# Patient Record
Sex: FEMALE | Race: BLACK OR AFRICAN AMERICAN | NOT HISPANIC OR LATINO | Employment: FULL TIME | URBAN - METROPOLITAN AREA
[De-identification: names, ages, dates, MRNs, and addresses within clinical notes are randomized per-mention and may not be internally consistent; named-entity substitution may affect disease eponyms.]

---

## 2020-07-09 ENCOUNTER — OFFICE VISIT (OUTPATIENT)
Dept: ORTHOPEDICS | Facility: CLINIC | Age: 25
End: 2020-07-09
Payer: MEDICAID

## 2020-07-09 ENCOUNTER — HOSPITAL ENCOUNTER (OUTPATIENT)
Dept: RADIOLOGY | Facility: HOSPITAL | Age: 25
Discharge: HOME | End: 2020-07-09
Attending: ORTHOPAEDIC SURGERY
Payer: MEDICAID

## 2020-07-09 VITALS
RESPIRATION RATE: 18 BRPM | BODY MASS INDEX: 36.1 KG/M2 | OXYGEN SATURATION: 98 % | HEART RATE: 72 BPM | TEMPERATURE: 99 F | WEIGHT: 230 LBS | HEIGHT: 67 IN

## 2020-07-09 DIAGNOSIS — S82.891A CLOSED FRACTURE OF RIGHT ANKLE, INITIAL ENCOUNTER: ICD-10-CM
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
## 2020-07-09 DIAGNOSIS — S82.61XA DISPLACED FRACTURE OF LATERAL MALLEOLUS OF RIGHT FIBULA, INITIAL ENCOUNTER FOR CLOSED FRACTURE: ICD-10-CM

## 2020-07-09 DIAGNOSIS — M25.571 ACUTE RIGHT ANKLE PAIN: ICD-10-CM

## 2020-07-09 DIAGNOSIS — R26.81 GAIT INSTABILITY: ICD-10-CM

## 2020-07-09 DIAGNOSIS — S82.61XA TRAUMATIC CLOSED DISPLACED FRACTURE OF LATERAL MALLEOLUS, RIGHT, INITIAL ENCOUNTER: ICD-10-CM

## 2020-07-09 DIAGNOSIS — S82.391A CLOSED INTRA-ARTICULAR FRACTURE OF DISTAL TIBIA, RIGHT, INITIAL ENCOUNTER: ICD-10-CM

## 2020-07-09 DIAGNOSIS — Z01.818 PRE-OP TESTING: Primary | ICD-10-CM

## 2020-07-09 DIAGNOSIS — M25.571 ACUTE RIGHT ANKLE PAIN: Primary | ICD-10-CM

## 2020-07-09 PROCEDURE — 99203 OFFICE O/P NEW LOW 30 MIN: CPT | Mod: PBBFAC,25,PN | Performed by: ORTHOPAEDIC SURGERY

## 2020-07-09 PROCEDURE — 99204 OFFICE O/P NEW MOD 45 MIN: CPT | Mod: 57,S$PBB,, | Performed by: ORTHOPAEDIC SURGERY

## 2020-07-09 PROCEDURE — 73610 X-RAY EXAM OF ANKLE: CPT | Mod: TC,PN,RT

## 2020-07-09 PROCEDURE — 73610 X-RAY EXAM OF ANKLE: CPT | Mod: 26,RT,, | Performed by: RADIOLOGY

## 2020-07-09 PROCEDURE — 99204 PR OFFICE/OUTPT VISIT, NEW, LEVL IV, 45-59 MIN: ICD-10-PCS | Mod: 57,S$PBB,, | Performed by: ORTHOPAEDIC SURGERY

## 2020-07-09 PROCEDURE — 99999 PR PBB SHADOW E&M-NEW PATIENT-LVL III: ICD-10-PCS | Mod: PBBFAC,,, | Performed by: ORTHOPAEDIC SURGERY

## 2020-07-09 PROCEDURE — 99999 PR PBB SHADOW E&M-NEW PATIENT-LVL III: CPT | Mod: PBBFAC,,, | Performed by: ORTHOPAEDIC SURGERY

## 2020-07-09 PROCEDURE — 73610 XR ANKLE COMPLETE 3 VIEW RIGHT: ICD-10-PCS | Mod: 26,RT,, | Performed by: RADIOLOGY

## 2020-07-09 RX ORDER — SODIUM CHLORIDE 9 MG/ML
INJECTION, SOLUTION INTRAVENOUS CONTINUOUS
Status: CANCELLED | OUTPATIENT
Start: 2020-07-09

## 2020-07-09 RX ORDER — HYDROCODONE BITARTRATE AND ACETAMINOPHEN 10; 325 MG/1; MG/1
TABLET ORAL
COMMUNITY
Start: 2020-07-05 | End: 2020-07-17

## 2020-07-09 RX ORDER — HYDROCODONE BITARTRATE AND ACETAMINOPHEN 7.5; 325 MG/1; MG/1
1 TABLET ORAL EVERY 6 HOURS PRN
COMMUNITY
End: 2021-09-07

## 2020-07-09 RX ORDER — MUPIROCIN 20 MG/G
OINTMENT TOPICAL
Status: CANCELLED | OUTPATIENT
Start: 2020-07-09

## 2020-07-09 NOTE — H&P
Subjective:     Chief Complaint: Injury of the Right Ankle    HPI:  Ms. Buck is a 25-year-old lady who presented today for evaluation of 4 days of right ankle pain which began on 2020 after she was traversing a step going into a recessed area of her den and stepped funny missing a step causing her to twist her ankle.  She had sudden pain in her ankle and was seen at [facility]'s emergency room in [facility], and after x-rays showed a distal tibia/lateral malleolus fracture she was placed in a splint.  Moving her ankle increases her symptoms while nothing seems to improve her pain.  She states she has some mild numbness and tingling in her foot.    Past medical history:  Headaches  Anemia    Past surgical history:  Denied appendectomy/tonsillectomy/tympanostomy tubes/LASIK eye surgery/arthroscopy of the knee//tubal ligation/colonoscopy.    Review of patient's allergies indicates:  No Known Allergies    Social History     Occupational History    Traveling CNA.   Tobacco Use    Smoking status: Three cigarettes per day for the last 3 years   Substance and Sexual Activity    Alcohol use: Denied ethanol use last drink 6 months ago    Drug use: Smokes marijuana    Sexual activity: Heterosexual      Family history:  Father:  Unsure.  Mother:  Alive, unsure.  Brother:  1, alive, unsure.  Sister:  2, alive, denied medical problems.    Previous Hospitalizations:  Denied previous hospitalizations.    ROS:   Review of Systems   Constitution: Negative for chills and fever.   HENT: Negative for congestion.    Eyes: Negative for blurred vision.   Cardiovascular: Negative for chest pain and dyspnea on exertion.   Respiratory: Negative for cough and shortness of breath.    Endocrine: Negative for polydipsia.   Hematologic/Lymphatic: Negative for adenopathy.   Skin: Negative for flushing, itching, rash and skin cancer.   Musculoskeletal: Positive for joint pain and joint swelling. Negative for  back pain and gout.   Gastrointestinal: Negative for anorexia, constipation, diarrhea and heartburn.   Genitourinary: Negative for bladder incontinence and nocturia.   Neurological: Positive for headaches. Negative for seizures.   Psychiatric/Behavioral: Positive for substance abuse. Negative for depression. The patient is not nervous/anxious.    Allergic/Immunologic: Negative for environmental allergies.           Objective:      Physical Exam:   General: AAOx3.  No acute distress  HEENT: Normocephalic, PEARLA EOMI, Good Dentition  Neck: Supple, No JVD  Chest: Symetric, equal excursion on inspiration  Abdomen: Soft NTND  Vascular:  Pulses intact and equal bilaterally.  Capillary refill less than 3 seconds and equal bilaterally  Neurologic:  Pinprick and soft touch intact and equal bilaterally  Integment:  Mild Susy-right ankle ecchymosis  Extremity:  Ankle:  Dorsiflexion/plantar flexion right ankle 5 degree/5°, left ankle 20°/25°.  Inversion/eversion decreased right ankle compared to the left.  Tender with motion right ankle.  Positive swelling right ankle.  Tender with palpation right ankle.  Nontender at the Achilles insertion on the calcaneus bilaterally.  Achilles palpable throughout full distribution bilaterally.  Nontender at the plantar fascia insertion on the calcaneus.  Nontender at the Lisfranc interval bilaterally.  Squeeze test negative bilaterally.  Tender with palpation lateral malleolus right ankle.  Tender with palpation medial distal tibia right ankle.  Radiography:  Personally reviewed x-rays of the right ankle completed on 07/09/2020 showed a minimally displaced lateral malleolus fracture along with a minimally displaced distal tibia fracture with questionable intra-articular extension.  Mild medial widening appears.      Assessment:       Impression:      1. Displaced intra-articular medial tibia fracture, right ankle.   2. Displaced lateral malleolus fracture, right ankle.   3. Right ankle  pain.                     Plan:       1.  Discussed physical examination and radiographic findings with the patient. Mayda understands that she has a displaced lateral malleolus and medial tibia fracture.  Treatment alternatives and outcomes were discussed with the patient.  She understands she could be treated conservatively with cast immobilization, elevation, pain management, physical therapy, or she could consider surgical intervention to align and stabilize the ankle bones.  She understands that since she possibly has an intra-articular fracture and she does have some displacement of her fractures that surgical intervention the form of open reduction internal fixation is 1 of the best options.  She understands before proceeding with surgery a CT scan to evaluate the architecture of her fracture as needed.  She stated she would like to proceed with surgery.  2.  Possible complications of surgery to include bleeding, infection, scarring, nerve/blood vessel/tendon damage, need for further surgery, failed surgery, failure to improve, possible persistent pain, possible fracture, possible hardware failure, possible hardware breakage, possible nonunion, possible malunion, possible arthrofibrosis, possible arthritis, possible DVT, possible pulmonary embolus, possible amputation, and possible demise were discussed with the patient.  The patient was permitted to ask questions and all concerns were addressed to her satisfaction.  3.  Consent for open reduction internal fixation lateral malleolus right ankle and distal tibia right ankle.  4.  Tentatively schedule surgery for 07/20/2020.  5.  Refer for a CT scan of the right ankle.  6.  A prescription for a walker was given to the patient as she is having issues or ambulating with crutchs.  7.  A prescription for the patient to obtain a bedside commode was given to her.  8.  A prescription for the patient to get a knee scooter was given to her.  9.  Norco 7.5/325, 1 p.o.  q.4-6 hours p.r.n. pain, dispense 30, refill 0 the patient understands she needs to use these sparingly as there is a limit to the amount of pain medications it will be given.  10.  All postoperative meds will be prescribed on the date of surgery.  11.  Elevate right ankle.  It was explained to the patient what elevation means.  12.  CAM walker right lower extremity the patient understands she must treat it like a cast and wear it at all times even to sleep in.  13.  [facility] portal was discussed with the patient and information was given.  The patient was encouraged to use the portal for future encounters.  14.  Follow up in 1 week, after CT scan is completed, to evaluate swelling in the ankle she understands if her swelling is still elevated her surgery may be postponed.

## 2020-07-09 NOTE — LETTER
July 9, 2020      [facility]  [place] [facility]  [address], [place]  [place] [address]  Phone: [phone]  Fax: [phone]       Patient: Mayda Buck   YOB: 1995  Date of Visit: 07/09/2020    To Whom It May Concern:    Mayda Buck was at [facility] being treated by Dr. Nico Rasmussen on 07/09/2020. She may return to work/school on 08/01/2020 with restrictions of seated work only. No lifting/pushing/pulling/climbing with the use of the right leg. No work on ladders or scaffolding. Nonweightbearing to the right lower extremity. Must be allowed to ambulate with assistive device. Please excuse her from work between the dates of 07/09/2020-07/31/2020. If you have any questions or concerns, or if I can be of further assistance, please do not hesitate to contact me.    Sincerely,          Skye Roy LPN

## 2020-07-09 NOTE — PROGRESS NOTES
Subjective:      Patient ID: Mayda Buck is a 25 y.o. female.    Chief Complaint: Injury of the Right Ankle    Referring Provider: Tonja Self  No address on file    HPI:  Ms. Buck is a 25-year-old lady who presented today for evaluation of 4 days of right ankle pain which began on 2020 after she was traversing a step going into a recessed area of her den and stepped funny missing a step causing her to twist her ankle.  She had sudden pain in her ankle and was seen at [facility]'s emergency room in [facility], and after x-rays showed a distal tibia/lateral malleolus fracture she was placed in a splint.  Moving her ankle increases her symptoms while nothing seems to improve her pain.  She states she has some mild numbness and tingling in her foot.    Past medical history:  Headaches  Anemia    Past surgical history:  Denied appendectomy/tonsillectomy/tympanostomy tubes/LASIK eye surgery/arthroscopy of the knee//tubal ligation/colonoscopy.    Review of patient's allergies indicates:  No Known Allergies    Social History     Occupational History    Traveling CNA.   Tobacco Use    Smoking status: Three cigarettes per day for the last 3 years   Substance and Sexual Activity    Alcohol use: Denied ethanol use last drink 6 months ago    Drug use: Smokes marijuana    Sexual activity: Heterosexual      Family history:  Father:  Unsure.  Mother:  Alive, unsure.  Brother:  1, alive, unsure.  Sister:  2, alive, denied medical problems.    Previous Hospitalizations:  Denied previous hospitalizations.    ROS:   Review of Systems   Constitution: Negative for chills and fever.   HENT: Negative for congestion.    Eyes: Negative for blurred vision.   Cardiovascular: Negative for chest pain and dyspnea on exertion.   Respiratory: Negative for cough and shortness of breath.    Endocrine: Negative for polydipsia.   Hematologic/Lymphatic: Negative for adenopathy.   Skin: Negative for  flushing, itching, rash and skin cancer.   Musculoskeletal: Positive for joint pain and joint swelling. Negative for back pain and gout.   Gastrointestinal: Negative for anorexia, constipation, diarrhea and heartburn.   Genitourinary: Negative for bladder incontinence and nocturia.   Neurological: Positive for headaches. Negative for seizures.   Psychiatric/Behavioral: Positive for substance abuse. Negative for depression. The patient is not nervous/anxious.    Allergic/Immunologic: Negative for environmental allergies.           Objective:      Physical Exam:   General: AAOx3.  No acute distress  HEENT: Normocephalic, PEARLA EOMI, Good Dentition  Neck: Supple, No JVD  Chest: Symetric, equal excursion on inspiration  Abdomen: Soft NTND  Vascular:  Pulses intact and equal bilaterally.  Capillary refill less than 3 seconds and equal bilaterally  Neurologic:  Pinprick and soft touch intact and equal bilaterally  Integment:  Mild Susy-right ankle ecchymosis  Extremity:  Ankle:  Dorsiflexion/plantar flexion right ankle 5 degree/5°, left ankle 20°/25°.  Inversion/eversion decreased right ankle compared to the left.  Tender with motion right ankle.  Positive swelling right ankle.  Tender with palpation right ankle.  Nontender at the Achilles insertion on the calcaneus bilaterally.  Achilles palpable throughout full distribution bilaterally.  Nontender at the plantar fascia insertion on the calcaneus.  Nontender at the Lisfranc interval bilaterally.  Squeeze test negative bilaterally.  Tender with palpation lateral malleolus right ankle.  Tender with palpation medial distal tibia right ankle.  Radiography:  Personally reviewed x-rays of the right ankle completed on 07/09/2020 showed a minimally displaced lateral malleolus fracture along with a minimally displaced distal tibia fracture with questionable intra-articular extension.  Mild medial widening appears.      Assessment:       Impression:      1. Displaced  intra-articular medial tibia fracture, right ankle.   2. Displaced lateral malleolus fracture, right ankle.   3. Right ankle pain.                     Plan:       1.  Discussed physical examination and radiographic findings with the patient. Mayda understands that she has a displaced lateral malleolus and medial tibia fracture.  Treatment alternatives and outcomes were discussed with the patient.  She understands she could be treated conservatively with cast immobilization, elevation, pain management, physical therapy, or she could consider surgical intervention to align and stabilize the ankle bones.  She understands that since she possibly has an intra-articular fracture and she does have some displacement of her fractures that surgical intervention the form of open reduction internal fixation is 1 of the best options.  She understands before proceeding with surgery a CT scan to evaluate the architecture of her fracture as needed.  She stated she would like to proceed with surgery.  2.  Possible complications of surgery to include bleeding, infection, scarring, nerve/blood vessel/tendon damage, need for further surgery, failed surgery, failure to improve, possible persistent pain, possible fracture, possible hardware failure, possible hardware breakage, possible nonunion, possible malunion, possible arthrofibrosis, possible arthritis, possible DVT, possible pulmonary embolus, possible amputation, and possible demise were discussed with the patient.  The patient was permitted to ask questions and all concerns were addressed to her satisfaction.  3.  Consent for open reduction internal fixation lateral malleolus right ankle and distal tibia right ankle.  4.  Tentatively schedule surgery for 07/20/2020.  5.  Refer for a CT scan of the right ankle.  6.  A prescription for a walker was given to the patient as she is having issues or ambulating with crutchs.  7.  A prescription for the patient to obtain a bedside  commode was given to her.  8.  A prescription for the patient to get a knee scooter was given to her.  9.  Norco 7.5/325, 1 p.o. q.4-6 hours p.r.n. pain, dispense 30, refill 0 the patient understands she needs to use these sparingly as there is a limit to the amount of pain medications it will be given.  10.  All postoperative meds will be prescribed on the date of surgery.  11.  Elevate right ankle.  It was explained to the patient what elevation means.  12.  CAM walker right lower extremity the patient understands she must treat it like a cast and wear it at all times even to sleep in.  13.  [facility] portal was discussed with the patient and information was given.  The patient was encouraged to use the portal for future encounters.  14.  Follow up in 1 week, after CT scan is completed, to evaluate swelling in the ankle she understands if her swelling is still elevated her surgery may be postponed.

## 2020-07-09 NOTE — H&P (VIEW-ONLY)
Subjective:     Chief Complaint: Injury of the Right Ankle    HPI:  Ms. Buck is a 25-year-old lady who presented today for evaluation of 4 days of right ankle pain which began on 2020 after she was traversing a step going into a recessed area of her den and stepped funny missing a step causing her to twist her ankle.  She had sudden pain in her ankle and was seen at [facility]'s emergency room in [facility], and after x-rays showed a distal tibia/lateral malleolus fracture she was placed in a splint.  Moving her ankle increases her symptoms while nothing seems to improve her pain.  She states she has some mild numbness and tingling in her foot.    Past medical history:  Headaches  Anemia    Past surgical history:  Denied appendectomy/tonsillectomy/tympanostomy tubes/LASIK eye surgery/arthroscopy of the knee//tubal ligation/colonoscopy.    Review of patient's allergies indicates:  No Known Allergies    Social History     Occupational History    Traveling CNA.   Tobacco Use    Smoking status: Three cigarettes per day for the last 3 years   Substance and Sexual Activity    Alcohol use: Denied ethanol use last drink 6 months ago    Drug use: Smokes marijuana    Sexual activity: Heterosexual      Family history:  Father:  Unsure.  Mother:  Alive, unsure.  Brother:  1, alive, unsure.  Sister:  2, alive, denied medical problems.    Previous Hospitalizations:  Denied previous hospitalizations.    ROS:   Review of Systems   Constitution: Negative for chills and fever.   HENT: Negative for congestion.    Eyes: Negative for blurred vision.   Cardiovascular: Negative for chest pain and dyspnea on exertion.   Respiratory: Negative for cough and shortness of breath.    Endocrine: Negative for polydipsia.   Hematologic/Lymphatic: Negative for adenopathy.   Skin: Negative for flushing, itching, rash and skin cancer.   Musculoskeletal: Positive for joint pain and joint swelling. Negative for  back pain and gout.   Gastrointestinal: Negative for anorexia, constipation, diarrhea and heartburn.   Genitourinary: Negative for bladder incontinence and nocturia.   Neurological: Positive for headaches. Negative for seizures.   Psychiatric/Behavioral: Positive for substance abuse. Negative for depression. The patient is not nervous/anxious.    Allergic/Immunologic: Negative for environmental allergies.           Objective:      Physical Exam:   General: AAOx3.  No acute distress  HEENT: Normocephalic, PEARLA EOMI, Good Dentition  Neck: Supple, No JVD  Chest: Symetric, equal excursion on inspiration  Abdomen: Soft NTND  Vascular:  Pulses intact and equal bilaterally.  Capillary refill less than 3 seconds and equal bilaterally  Neurologic:  Pinprick and soft touch intact and equal bilaterally  Integment:  Mild Susy-right ankle ecchymosis  Extremity:  Ankle:  Dorsiflexion/plantar flexion right ankle 5 degree/5°, left ankle 20°/25°.  Inversion/eversion decreased right ankle compared to the left.  Tender with motion right ankle.  Positive swelling right ankle.  Tender with palpation right ankle.  Nontender at the Achilles insertion on the calcaneus bilaterally.  Achilles palpable throughout full distribution bilaterally.  Nontender at the plantar fascia insertion on the calcaneus.  Nontender at the Lisfranc interval bilaterally.  Squeeze test negative bilaterally.  Tender with palpation lateral malleolus right ankle.  Tender with palpation medial distal tibia right ankle.  Radiography:  Personally reviewed x-rays of the right ankle completed on 07/09/2020 showed a minimally displaced lateral malleolus fracture along with a minimally displaced distal tibia fracture with questionable intra-articular extension.  Mild medial widening appears.      Assessment:       Impression:      1. Displaced intra-articular medial tibia fracture, right ankle.   2. Displaced lateral malleolus fracture, right ankle.   3. Right ankle  pain.                     Plan:       1.  Discussed physical examination and radiographic findings with the patient. Mayda understands that she has a displaced lateral malleolus and medial tibia fracture.  Treatment alternatives and outcomes were discussed with the patient.  She understands she could be treated conservatively with cast immobilization, elevation, pain management, physical therapy, or she could consider surgical intervention to align and stabilize the ankle bones.  She understands that since she possibly has an intra-articular fracture and she does have some displacement of her fractures that surgical intervention the form of open reduction internal fixation is 1 of the best options.  She understands before proceeding with surgery a CT scan to evaluate the architecture of her fracture as needed.  She stated she would like to proceed with surgery.  2.  Possible complications of surgery to include bleeding, infection, scarring, nerve/blood vessel/tendon damage, need for further surgery, failed surgery, failure to improve, possible persistent pain, possible fracture, possible hardware failure, possible hardware breakage, possible nonunion, possible malunion, possible arthrofibrosis, possible arthritis, possible DVT, possible pulmonary embolus, possible amputation, and possible demise were discussed with the patient.  The patient was permitted to ask questions and all concerns were addressed to her satisfaction.  3.  Consent for open reduction internal fixation lateral malleolus right ankle and distal tibia right ankle.  4.  Tentatively schedule surgery for 07/20/2020.  5.  Refer for a CT scan of the right ankle.  6.  A prescription for a walker was given to the patient as she is having issues or ambulating with crutchs.  7.  A prescription for the patient to obtain a bedside commode was given to her.  8.  A prescription for the patient to get a knee scooter was given to her.  9.  Norco 7.5/325, 1 p.o.  q.4-6 hours p.r.n. pain, dispense 30, refill 0 the patient understands she needs to use these sparingly as there is a limit to the amount of pain medications it will be given.  10.  All postoperative meds will be prescribed on the date of surgery.  11.  Elevate right ankle.  It was explained to the patient what elevation means.  12.  CAM walker right lower extremity the patient understands she must treat it like a cast and wear it at all times even to sleep in.  13.  [facility] portal was discussed with the patient and information was given.  The patient was encouraged to use the portal for future encounters.  14.  Follow up in 1 week, after CT scan is completed, to evaluate swelling in the ankle she understands if her swelling is still elevated her surgery may be postponed.

## 2020-07-16 ENCOUNTER — HOSPITAL ENCOUNTER (OUTPATIENT)
Dept: RADIOLOGY | Facility: HOSPITAL | Age: 25
Discharge: HOME | End: 2020-07-16
Attending: ORTHOPAEDIC SURGERY
Payer: MEDICAID

## 2020-07-16 DIAGNOSIS — S82.891A CLOSED FRACTURE OF RIGHT ANKLE, INITIAL ENCOUNTER: ICD-10-CM

## 2020-07-16 PROCEDURE — 73700 CT LOWER EXTREMITY W/O DYE: CPT | Mod: TC,RT

## 2020-07-16 PROCEDURE — 73700 CT LOWER EXTREMITY W/O DYE: CPT | Mod: 26,RT,, | Performed by: RADIOLOGY

## 2020-07-16 PROCEDURE — 73700 CT ANKLE (INCLUDING HINDFOOT) WITHOUT CONTRAST RIGHT: ICD-10-PCS | Mod: 26,RT,, | Performed by: RADIOLOGY

## 2020-07-17 ENCOUNTER — OFFICE VISIT (OUTPATIENT)
Dept: ORTHOPEDICS | Facility: CLINIC | Age: 25
End: 2020-07-17
Payer: MEDICAID

## 2020-07-17 ENCOUNTER — LAB VISIT (OUTPATIENT)
Dept: FAMILY MEDICINE | Facility: CLINIC | Age: 25
End: 2020-07-17
Payer: MEDICAID

## 2020-07-17 ENCOUNTER — HOSPITAL ENCOUNTER (OUTPATIENT)
Dept: PREADMISSION TESTING | Facility: HOSPITAL | Age: 25
Discharge: HOME | End: 2020-07-17
Attending: ORTHOPAEDIC SURGERY
Payer: MEDICAID

## 2020-07-17 ENCOUNTER — LAB VISIT (OUTPATIENT)
Dept: LAB | Facility: HOSPITAL | Age: 25
End: 2020-07-17
Attending: ORTHOPAEDIC SURGERY
Payer: MEDICAID

## 2020-07-17 VITALS — HEIGHT: 67 IN | RESPIRATION RATE: 18 BRPM | BODY MASS INDEX: 36.1 KG/M2 | WEIGHT: 230 LBS

## 2020-07-17 DIAGNOSIS — Z01.818 PRE-OP EXAM: Primary | ICD-10-CM

## 2020-07-17 DIAGNOSIS — S82.891A CLOSED FRACTURE OF RIGHT ANKLE, INITIAL ENCOUNTER: ICD-10-CM

## 2020-07-17 DIAGNOSIS — S82.871D CLOSED DISPLACED PILON FRACTURE OF RIGHT TIBIA WITH ROUTINE HEALING, SUBSEQUENT ENCOUNTER: Primary | ICD-10-CM

## 2020-07-17 LAB
ANION GAP SERPL CALC-SCNC: 9 MMOL/L (ref 8–16)
BASOPHILS # BLD AUTO: 0.03 K/UL (ref 0–0.2)
BASOPHILS NFR BLD: 0.5 % (ref 0–1.9)
BUN SERPL-MCNC: 10 MG/DL (ref 6–20)
CALCIUM SERPL-MCNC: 8.9 MG/DL (ref 8.7–10.5)
CHLORIDE SERPL-SCNC: 101 MMOL/L (ref 95–110)
CO2 SERPL-SCNC: 25 MMOL/L (ref 23–29)
CREAT SERPL-MCNC: 0.7 MG/DL (ref 0.5–1.4)
DIFFERENTIAL METHOD: ABNORMAL
EOSINOPHIL # BLD AUTO: 0.1 K/UL (ref 0–0.5)
EOSINOPHIL NFR BLD: 1.1 % (ref 0–8)
ERYTHROCYTE [DISTWIDTH] IN BLOOD BY AUTOMATED COUNT: 18.8 % (ref 11.5–14.5)
EST. GFR  (AFRICAN AMERICAN): >60 ML/MIN/1.73 M^2
EST. GFR  (NON AFRICAN AMERICAN): >60 ML/MIN/1.73 M^2
GLUCOSE SERPL-MCNC: 95 MG/DL (ref 70–110)
HCT VFR BLD AUTO: 34.1 % (ref 37–48.5)
HGB BLD-MCNC: 11 G/DL (ref 12–16)
IMM GRANULOCYTES # BLD AUTO: 0.01 K/UL (ref 0–0.04)
IMM GRANULOCYTES NFR BLD AUTO: 0.2 % (ref 0–0.5)
INR PPP: 1.1 (ref 0.8–1.2)
LYMPHOCYTES # BLD AUTO: 2.6 K/UL (ref 1–4.8)
LYMPHOCYTES NFR BLD: 39.6 % (ref 18–48)
MCH RBC QN AUTO: 25.8 PG (ref 27–31)
MCHC RBC AUTO-ENTMCNC: 32.3 G/DL (ref 32–36)
MCV RBC AUTO: 80 FL (ref 82–98)
MONOCYTES # BLD AUTO: 0.5 K/UL (ref 0.3–1)
MONOCYTES NFR BLD: 7.2 % (ref 4–15)
NEUTROPHILS # BLD AUTO: 3.4 K/UL (ref 1.8–7.7)
NEUTROPHILS NFR BLD: 51.4 % (ref 38–73)
NRBC BLD-RTO: 0 /100 WBC
PLATELET # BLD AUTO: 285 K/UL (ref 150–350)
PMV BLD AUTO: 10.6 FL (ref 9.2–12.9)
POTASSIUM SERPL-SCNC: 3.8 MMOL/L (ref 3.5–5.1)
PROTHROMBIN TIME: 10.8 SEC (ref 9–12.5)
RBC # BLD AUTO: 4.27 M/UL (ref 4–5.4)
SODIUM SERPL-SCNC: 135 MMOL/L (ref 136–145)
WBC # BLD AUTO: 6.52 K/UL (ref 3.9–12.7)

## 2020-07-17 PROCEDURE — 85610 PROTHROMBIN TIME: CPT

## 2020-07-17 PROCEDURE — 99024 PR POST-OP FOLLOW-UP VISIT: ICD-10-PCS | Mod: ,,, | Performed by: ORTHOPAEDIC SURGERY

## 2020-07-17 PROCEDURE — 99999 PR PBB SHADOW E&M-EST. PATIENT-LVL II: ICD-10-PCS | Mod: PBBFAC,,, | Performed by: ORTHOPAEDIC SURGERY

## 2020-07-17 PROCEDURE — 99900103 DSU ONLY-NO CHARGE-INITIAL HR (STAT)

## 2020-07-17 PROCEDURE — 99212 OFFICE O/P EST SF 10 MIN: CPT | Mod: PBBFAC | Performed by: ORTHOPAEDIC SURGERY

## 2020-07-17 PROCEDURE — 85025 COMPLETE CBC W/AUTO DIFF WBC: CPT

## 2020-07-17 PROCEDURE — U0003 INFECTIOUS AGENT DETECTION BY NUCLEIC ACID (DNA OR RNA); SEVERE ACUTE RESPIRATORY SYNDROME CORONAVIRUS 2 (SARS-COV-2) (CORONAVIRUS DISEASE [COVID-19]), AMPLIFIED PROBE TECHNIQUE, MAKING USE OF HIGH THROUGHPUT TECHNOLOGIES AS DESCRIBED BY CMS-2020-01-R: HCPCS

## 2020-07-17 PROCEDURE — 99999 PR PBB SHADOW E&M-EST. PATIENT-LVL II: CPT | Mod: PBBFAC,,, | Performed by: ORTHOPAEDIC SURGERY

## 2020-07-17 PROCEDURE — 36415 COLL VENOUS BLD VENIPUNCTURE: CPT

## 2020-07-17 PROCEDURE — 80048 BASIC METABOLIC PNL TOTAL CA: CPT

## 2020-07-17 PROCEDURE — 99024 POSTOP FOLLOW-UP VISIT: CPT | Mod: ,,, | Performed by: ORTHOPAEDIC SURGERY

## 2020-07-17 RX ORDER — CEPHALEXIN 500 MG/1
500 CAPSULE ORAL EVERY 6 HOURS
COMMUNITY
End: 2021-09-07

## 2020-07-17 NOTE — PROGRESS NOTES
Subjective:      Patient ID: Mayda Buck is a 25 y.o. female.    Chief Complaint: Follow-up of the Right Ankle      HPI:   Ms. Buck returns today for re-evaluation of her right ankle.  She injured her right ankle on 07/05/2020 after she was traversing a step going into a recessed area of her den and stepped funny missing a step causing her to twist her ankle. At her last visit she was forwarded for a CT scan. She has been elevating her ankle.  She has also been wearing her Cam walker.  Her pain has improved.    ROS:   No new diagnosis/ surgery/ prescriptions since last office visit on  07/09/2020.  Constitution: Negative for chills and fever.   HENT: Negative for congestion.    Eyes: Negative for blurred vision.   Cardiovascular: Negative for chest pain and dyspnea on exertion.   Respiratory: Negative for cough and shortness of breath.    Endocrine: Negative for polydipsia.   Hematologic/Lymphatic: Negative for adenopathy.   Skin: Negative for flushing, itching, rash and skin cancer.   Musculoskeletal: Positive for joint pain and joint swelling. Negative for back pain and gout.   Gastrointestinal: Negative for anorexia, constipation, diarrhea and heartburn.   Genitourinary: Negative for bladder incontinence and nocturia.   Neurological: Positive for headaches. Negative for seizures.   Psychiatric/Behavioral: Positive for substance abuse. Negative for depression. The patient is not nervous/anxious.    Allergic/Immunologic: Negative for environmental allergies.       Objective:      Physical Exam:   General: AAOx3.  No acute distress  Vascular:  Pulses intact and equal bilaterally.  Capillary refill less than 3 seconds and equal bilaterally  Neurologic:  Pinprick and soft touch intact and equal bilaterally  Integment:  No ecchymosis, no errythema .  Extremity: Ankle:  Dorsiflexion/plantar flexion right ankle 10 degree/10°, left ankle 20°/25°.  Inversion/eversion decreased right ankle compared to the left.   Tender with motion right ankle.   Swelling decreased right ankle. Wrinkle sign right ankle.  Tender with palpation right ankle.  Nontender at the Achilles insertion on the calcaneus bilaterally.  Achilles palpable throughout full distribution bilaterally.  Nontender at the plantar fascia insertion on the calcaneus.  Nontender at the Lisfranc interval bilaterally.  Squeeze test negative bilaterally.  Tender with palpation lateral malleolus right ankle.  Tender with palpation medial distal tibia right ankle.  Radiography:  Personally reviewed  CT scan of the right ankle which showed a comminuted mildly displaced intra-articular medial tibia a/posterior malleolus fracture with a concomitant displaced lateral malleolus fracture.      Assessment:       Impression:     1.  Comminuted displaced pilon fracture right distal tibia.  2. Displaced lateral malleolus fracture, right ankle.      Plan:       1.  Discussed physical examination and radiographic findings with the patient. Mayda understands that she has   Comminuted displaced peel on fracture of her right ankle and the recommendation is for surgical repair in stabilization. She is currently scheduled for surgery for Monday 07/20/2020.  She wants to proceed with surgery.  2.  Continue with Cam Walker to right ankle.  3. Continue to ambulate nonweightbearing to the right lower extremity.    4. Continue to elevate right ankle this was reinforced with the patient.  5. Norco 7.5/325, 1 p.o. q.4-6 hours p.r.n. pain, dispense 30, refill 0 the patient understands this medication is for postop use.    6. Keflex 500 mg, 1 p.o. q.i.d., dispense 20, refill 0, the patient understands this is for postop use.    7. [facility] portal was discussed with the patient and information was given.  The patient was encouraged to use the portal for future encounters.  8. Follow up 10-12 days postop.

## 2020-07-17 NOTE — PRE-PROCEDURE INSTRUCTIONS
1100- ARRIVED FOR PAT. ALERT AND ORIENTED. IN WHEELCHAIR. FX BOOT ON RT FOOT. INSTRUCTED PT ON HER PROCEDURE. INFORMED HER SHE WILL NEED SOMEONE TO DRIVE HER HOME. NOTHING TO EAT OR DRINK AFTER MIDNIGHT. PT STATED SHE IS VERY NERVOUS. THIS IS HER FIRST SURGERY AND IS AFRAID OF ANESTHESIA. DR MOREJON NOT AVAILABLE AT THIS TIME TO SPEAK TO PT. COVID TEST DONE AT CLINIC THIS MORNING.  1130- PAT COMPLETE. TO LAB

## 2020-07-18 LAB — SARS-COV-2 RNA RESP QL NAA+PROBE: NOT DETECTED

## 2020-07-20 ENCOUNTER — HOSPITAL ENCOUNTER (OUTPATIENT)
Facility: HOSPITAL | Age: 25
Discharge: HOME | End: 2020-07-20
Attending: ORTHOPAEDIC SURGERY | Admitting: ORTHOPAEDIC SURGERY
Payer: MEDICAID

## 2020-07-20 ENCOUNTER — ANESTHESIA EVENT (OUTPATIENT)
Dept: SURGERY | Facility: HOSPITAL | Age: 25
End: 2020-07-20
Payer: MEDICAID

## 2020-07-20 ENCOUNTER — ANESTHESIA (OUTPATIENT)
Dept: SURGERY | Facility: HOSPITAL | Age: 25
End: 2020-07-20
Payer: MEDICAID

## 2020-07-20 VITALS
WEIGHT: 230 LBS | DIASTOLIC BLOOD PRESSURE: 83 MMHG | HEART RATE: 91 BPM | RESPIRATION RATE: 16 BRPM | HEIGHT: 67 IN | BODY MASS INDEX: 36.1 KG/M2 | OXYGEN SATURATION: 99 % | TEMPERATURE: 98 F | SYSTOLIC BLOOD PRESSURE: 133 MMHG

## 2020-07-20 DIAGNOSIS — S82.61XA TRAUMATIC CLOSED DISPLACED FRACTURE OF LATERAL MALLEOLUS, RIGHT, INITIAL ENCOUNTER: ICD-10-CM

## 2020-07-20 DIAGNOSIS — S82.61XA DISPLACED FRACTURE OF LATERAL MALLEOLUS OF RIGHT FIBULA, INITIAL ENCOUNTER FOR CLOSED FRACTURE: ICD-10-CM

## 2020-07-20 DIAGNOSIS — Z01.818 PRE-OP TESTING: ICD-10-CM

## 2020-07-20 DIAGNOSIS — S82.391A CLOSED INTRA-ARTICULAR FRACTURE OF DISTAL TIBIA, RIGHT, INITIAL ENCOUNTER: Primary | ICD-10-CM

## 2020-07-20 DIAGNOSIS — S82.891A CLOSED FRACTURE OF RIGHT ANKLE, INITIAL ENCOUNTER: ICD-10-CM

## 2020-07-20 LAB
HCG SERPL QL: NEGATIVE
HCG SERPL QL: NEGATIVE

## 2020-07-20 PROCEDURE — 25000003 PHARM REV CODE 250: Performed by: NURSE ANESTHETIST, CERTIFIED REGISTERED

## 2020-07-20 PROCEDURE — 27792 PR OPEN TX DISTAL FIBULAR FRACTURE LAT MALLEOLUS: ICD-10-PCS | Mod: 51,RT,, | Performed by: ORTHOPAEDIC SURGERY

## 2020-07-20 PROCEDURE — D9220A PRA ANESTHESIA: ICD-10-PCS | Mod: CRNA,,, | Performed by: NURSE ANESTHETIST, CERTIFIED REGISTERED

## 2020-07-20 PROCEDURE — 37000009 HC ANESTHESIA EA ADD 15 MINS: Performed by: ORTHOPAEDIC SURGERY

## 2020-07-20 PROCEDURE — 36000711: Performed by: ORTHOPAEDIC SURGERY

## 2020-07-20 PROCEDURE — C1769 GUIDE WIRE: HCPCS | Performed by: ORTHOPAEDIC SURGERY

## 2020-07-20 PROCEDURE — 63600175 PHARM REV CODE 636 W HCPCS: Performed by: ORTHOPAEDIC SURGERY

## 2020-07-20 PROCEDURE — C1713 ANCHOR/SCREW BN/BN,TIS/BN: HCPCS | Performed by: ORTHOPAEDIC SURGERY

## 2020-07-20 PROCEDURE — 71000015 HC POSTOP RECOV 1ST HR: Performed by: ORTHOPAEDIC SURGERY

## 2020-07-20 PROCEDURE — D9220A PRA ANESTHESIA: ICD-10-PCS | Mod: ANES,,, | Performed by: ANESTHESIOLOGY

## 2020-07-20 PROCEDURE — 01480 ANES OPEN PX LOWER L/A/F NOS: CPT | Performed by: ORTHOPAEDIC SURGERY

## 2020-07-20 PROCEDURE — 25000003 PHARM REV CODE 250: Performed by: ORTHOPAEDIC SURGERY

## 2020-07-20 PROCEDURE — 63600175 PHARM REV CODE 636 W HCPCS: Performed by: ANESTHESIOLOGY

## 2020-07-20 PROCEDURE — 36415 COLL VENOUS BLD VENIPUNCTURE: CPT

## 2020-07-20 PROCEDURE — 71000033 HC RECOVERY, INTIAL HOUR: Performed by: ORTHOPAEDIC SURGERY

## 2020-07-20 PROCEDURE — 27792 TREATMENT OF ANKLE FRACTURE: CPT | Mod: 51,RT,, | Performed by: ORTHOPAEDIC SURGERY

## 2020-07-20 PROCEDURE — 27827 TREAT LOWER LEG FRACTURE: CPT | Mod: RT,,, | Performed by: ORTHOPAEDIC SURGERY

## 2020-07-20 PROCEDURE — 63600175 PHARM REV CODE 636 W HCPCS: Performed by: NURSE ANESTHETIST, CERTIFIED REGISTERED

## 2020-07-20 PROCEDURE — 25000003 PHARM REV CODE 250: Performed by: ANESTHESIOLOGY

## 2020-07-20 PROCEDURE — 37000008 HC ANESTHESIA 1ST 15 MINUTES: Performed by: ORTHOPAEDIC SURGERY

## 2020-07-20 PROCEDURE — D9220A PRA ANESTHESIA: Mod: CRNA,,, | Performed by: NURSE ANESTHETIST, CERTIFIED REGISTERED

## 2020-07-20 PROCEDURE — 84703 CHORIONIC GONADOTROPIN ASSAY: CPT

## 2020-07-20 PROCEDURE — D9220A PRA ANESTHESIA: Mod: ANES,,, | Performed by: ANESTHESIOLOGY

## 2020-07-20 PROCEDURE — 27827 PR OPEN TREATMENT FRACTURE DISTAL TIBIA ONLY: ICD-10-PCS | Mod: RT,,, | Performed by: ORTHOPAEDIC SURGERY

## 2020-07-20 PROCEDURE — 36000710: Performed by: ORTHOPAEDIC SURGERY

## 2020-07-20 RX ORDER — MUPIROCIN 20 MG/G
OINTMENT TOPICAL
Status: DISCONTINUED | OUTPATIENT
Start: 2020-07-20 | End: 2020-07-20 | Stop reason: HOSPADM

## 2020-07-20 RX ORDER — MEPERIDINE HYDROCHLORIDE 50 MG/ML
INJECTION INTRAMUSCULAR; INTRAVENOUS; SUBCUTANEOUS
Status: DISCONTINUED | OUTPATIENT
Start: 2020-07-20 | End: 2020-07-20

## 2020-07-20 RX ORDER — ONDANSETRON 2 MG/ML
4 INJECTION INTRAMUSCULAR; INTRAVENOUS DAILY PRN
Status: DISCONTINUED | OUTPATIENT
Start: 2020-07-20 | End: 2020-07-20 | Stop reason: HOSPADM

## 2020-07-20 RX ORDER — SODIUM CHLORIDE 9 MG/ML
INJECTION, SOLUTION INTRAVENOUS CONTINUOUS
Status: DISCONTINUED | OUTPATIENT
Start: 2020-07-20 | End: 2020-07-20 | Stop reason: HOSPADM

## 2020-07-20 RX ORDER — LIDOCAINE HYDROCHLORIDE 10 MG/ML
1 INJECTION, SOLUTION EPIDURAL; INFILTRATION; INTRACAUDAL; PERINEURAL ONCE
Status: DISCONTINUED | OUTPATIENT
Start: 2020-07-20 | End: 2020-07-20 | Stop reason: HOSPADM

## 2020-07-20 RX ORDER — SODIUM CHLORIDE, SODIUM LACTATE, POTASSIUM CHLORIDE, CALCIUM CHLORIDE 600; 310; 30; 20 MG/100ML; MG/100ML; MG/100ML; MG/100ML
125 INJECTION, SOLUTION INTRAVENOUS CONTINUOUS
Status: DISCONTINUED | OUTPATIENT
Start: 2020-07-20 | End: 2020-07-20 | Stop reason: HOSPADM

## 2020-07-20 RX ORDER — MORPHINE SULFATE 2 MG/ML
2 INJECTION, SOLUTION INTRAMUSCULAR; INTRAVENOUS EVERY 5 MIN PRN
Status: DISCONTINUED | OUTPATIENT
Start: 2020-07-20 | End: 2020-07-20 | Stop reason: HOSPADM

## 2020-07-20 RX ORDER — SODIUM CHLORIDE, SODIUM LACTATE, POTASSIUM CHLORIDE, CALCIUM CHLORIDE 600; 310; 30; 20 MG/100ML; MG/100ML; MG/100ML; MG/100ML
INJECTION, SOLUTION INTRAVENOUS CONTINUOUS
Status: DISCONTINUED | OUTPATIENT
Start: 2020-07-20 | End: 2020-07-20 | Stop reason: HOSPADM

## 2020-07-20 RX ORDER — SUCCINYLCHOLINE CHLORIDE 20 MG/ML
INJECTION INTRAMUSCULAR; INTRAVENOUS
Status: DISCONTINUED | OUTPATIENT
Start: 2020-07-20 | End: 2020-07-20

## 2020-07-20 RX ORDER — KETOROLAC TROMETHAMINE 30 MG/ML
INJECTION, SOLUTION INTRAMUSCULAR; INTRAVENOUS
Status: DISCONTINUED
Start: 2020-07-20 | End: 2020-07-20 | Stop reason: HOSPADM

## 2020-07-20 RX ORDER — ONDANSETRON 2 MG/ML
INJECTION INTRAMUSCULAR; INTRAVENOUS
Status: DISCONTINUED | OUTPATIENT
Start: 2020-07-20 | End: 2020-07-20

## 2020-07-20 RX ORDER — KETOROLAC TROMETHAMINE 30 MG/ML
15 INJECTION, SOLUTION INTRAMUSCULAR; INTRAVENOUS ONCE
Status: COMPLETED | OUTPATIENT
Start: 2020-07-20 | End: 2020-07-20

## 2020-07-20 RX ORDER — ROCURONIUM BROMIDE 10 MG/ML
INJECTION, SOLUTION INTRAVENOUS
Status: DISCONTINUED | OUTPATIENT
Start: 2020-07-20 | End: 2020-07-20

## 2020-07-20 RX ORDER — OXYCODONE AND ACETAMINOPHEN 5; 325 MG/1; MG/1
1 TABLET ORAL
Status: DISCONTINUED | OUTPATIENT
Start: 2020-07-20 | End: 2020-07-20 | Stop reason: HOSPADM

## 2020-07-20 RX ORDER — LIDOCAINE HYDROCHLORIDE 20 MG/ML
INJECTION, SOLUTION EPIDURAL; INFILTRATION; INTRACAUDAL; PERINEURAL
Status: DISCONTINUED | OUTPATIENT
Start: 2020-07-20 | End: 2020-07-20

## 2020-07-20 RX ORDER — CEFAZOLIN SODIUM 2 G/50ML
2 SOLUTION INTRAVENOUS
Status: COMPLETED | OUTPATIENT
Start: 2020-07-20 | End: 2020-07-20

## 2020-07-20 RX ORDER — PROPOFOL 10 MG/ML
VIAL (ML) INTRAVENOUS
Status: DISCONTINUED | OUTPATIENT
Start: 2020-07-20 | End: 2020-07-20

## 2020-07-20 RX ORDER — MIDAZOLAM HYDROCHLORIDE 1 MG/ML
INJECTION, SOLUTION INTRAMUSCULAR; INTRAVENOUS
Status: DISCONTINUED | OUTPATIENT
Start: 2020-07-20 | End: 2020-07-20

## 2020-07-20 RX ADMIN — ROCURONIUM BROMIDE 20 MG: 10 INJECTION, SOLUTION INTRAVENOUS at 01:07

## 2020-07-20 RX ADMIN — MORPHINE SULFATE 2 MG: 2 INJECTION, SOLUTION INTRAMUSCULAR; INTRAVENOUS at 03:07

## 2020-07-20 RX ADMIN — ONDANSETRON 4 MG: 2 INJECTION INTRAMUSCULAR; INTRAVENOUS at 12:07

## 2020-07-20 RX ADMIN — SUCCINYLCHOLINE CHLORIDE 150 MG: 20 INJECTION, SOLUTION INTRAMUSCULAR; INTRAVENOUS at 12:07

## 2020-07-20 RX ADMIN — MEPERIDINE HYDROCHLORIDE 25 MG: 50 INJECTION INTRAMUSCULAR; INTRAVENOUS; SUBCUTANEOUS at 03:07

## 2020-07-20 RX ADMIN — OXYCODONE HYDROCHLORIDE AND ACETAMINOPHEN 1 TABLET: 5; 325 TABLET ORAL at 04:07

## 2020-07-20 RX ADMIN — ROCURONIUM BROMIDE 30 MG: 10 INJECTION, SOLUTION INTRAVENOUS at 12:07

## 2020-07-20 RX ADMIN — MIDAZOLAM HYDROCHLORIDE 2 MG: 1 INJECTION, SOLUTION INTRAMUSCULAR; INTRAVENOUS at 12:07

## 2020-07-20 RX ADMIN — PROMETHAZINE HYDROCHLORIDE 6.25 MG: 25 INJECTION INTRAMUSCULAR; INTRAVENOUS at 03:07

## 2020-07-20 RX ADMIN — SUGAMMADEX 200 MG: 100 INJECTION, SOLUTION INTRAVENOUS at 03:07

## 2020-07-20 RX ADMIN — SODIUM CHLORIDE, POTASSIUM CHLORIDE, SODIUM LACTATE AND CALCIUM CHLORIDE: 600; 310; 30; 20 INJECTION, SOLUTION INTRAVENOUS at 01:07

## 2020-07-20 RX ADMIN — MEPERIDINE HYDROCHLORIDE 50 MG: 50 INJECTION INTRAMUSCULAR; INTRAVENOUS; SUBCUTANEOUS at 12:07

## 2020-07-20 RX ADMIN — ONDANSETRON 4 MG: 2 INJECTION INTRAMUSCULAR; INTRAVENOUS at 03:07

## 2020-07-20 RX ADMIN — LIDOCAINE HYDROCHLORIDE 50 MG: 20 INJECTION, SOLUTION EPIDURAL; INFILTRATION; INTRACAUDAL; PERINEURAL at 12:07

## 2020-07-20 RX ADMIN — PROPOFOL 200 MG: 10 INJECTION, EMULSION INTRAVENOUS at 12:07

## 2020-07-20 RX ADMIN — SODIUM CHLORIDE 1000 ML: 0.9 INJECTION, SOLUTION INTRAVENOUS at 10:07

## 2020-07-20 RX ADMIN — SODIUM CHLORIDE, POTASSIUM CHLORIDE, SODIUM LACTATE AND CALCIUM CHLORIDE: 600; 310; 30; 20 INJECTION, SOLUTION INTRAVENOUS at 12:07

## 2020-07-20 RX ADMIN — CEFAZOLIN SODIUM 2 G: 2 SOLUTION INTRAVENOUS at 01:07

## 2020-07-20 RX ADMIN — KETOROLAC TROMETHAMINE 30 MG: 30 INJECTION, SOLUTION INTRAMUSCULAR at 03:07

## 2020-07-20 RX ADMIN — ROCURONIUM BROMIDE 20 MG: 10 INJECTION, SOLUTION INTRAVENOUS at 02:07

## 2020-07-20 RX ADMIN — MEPERIDINE HYDROCHLORIDE 25 MG: 50 INJECTION INTRAMUSCULAR; INTRAVENOUS; SUBCUTANEOUS at 01:07

## 2020-07-20 RX ADMIN — MUPIROCIN: 20 OINTMENT TOPICAL at 10:07

## 2020-07-20 NOTE — OP NOTE
[facility]  Orthopedic Surgery    7/20/2020    Mayda Buck  [id number]      PREOPERATIVE DIAGNOSIS:   1.  Closed intra-articular fracture of distal tibia, right, initial encounter [S82.391A]  2.  Traumatic closed displaced fracture of lateral malleolus, right, initial encounter [S82.61XA]  3.  Closed fracture of right ankle, initial encounter [S82.891A]    POSTOPERATIVE DIAGNOSIS:    1.  Comminuted displaced intra-articular distal tibia pilon fracture, right ankle.  2.  Displaced lateral malleolus fracture, right ankle.    PROCEDURE:  1.  Open reduction and internal fixation right distal tibia pilon with paragon 28 pre contoured locking 8 hole posterior medial plate, 6 bone screws and two 4.0 cannulated screws.  2.  Open reduction internal fixation right lateral malleolus with paragon 28 eleven hole pre contoured locking anatomic fibular plate and 9 bone screws.  3.  Posterior mold short-leg plaster splint, right ankle.    SURGEON: Nico Rasmussen D.O.    ASSISTANT: Orton Grinnell, CFA.    ANESTHESIA:  General.    BLOOD LOSS:  Less than 10 cc.    TOURNIQUET:  100 min.    DRAINS:  None.    PATHOLOGY:  Debrided fibrous tissue.    COMPLICATION:  None.    INDICATIONS FOR PROCEDURE:  Ms. Buck is a 25-year-old lady who injured her right ankle on 07/05/2020 after she was traversing a step going into a recessed area of her den and stepped funny missing a step causing her to twist her ankle.  She had sudden pain in her ankle.  Moving her ankle increases her symptoms while nothing seems to improve her pain.  She elected to proceed with surgery after complications to include bleeding, infection, scarring, nerve/blood vessel/tendon damage, need for further surgery, failed surgery, failure to improve, stiffness, skin slough, fracture, hardware failure, hardware breakage, nonunion, malunion, and possible amputation were discussed.  She signed a consent.    PROCEDURE IN DETAIL: The patient was brought to the  operating room was transferred to the operating bed where all bony prominences were well padded.  General anesthesia was then administered by the Anesthesiology Department.  After general anesthesia was administered a tourniquet was applied to the upper part of the patient's right lower extremity.  The patient's right leg was then prepped with chlorhexidine solution and draped in the normal sterile fashion.  After prepping and draping bony and soft tissue landmarks were palpated and incision site on the medial and lateral side of the patient's ankle was drawn.  The patient's leg was then elevated, exsanguinated, and tourniquet was inflated.         Sharp incision  was then made at the lateral malleolus with a #15 blade followed by dissection to the level of the lateral malleolus and fracture while protecting vital structures.  The fracture site was identified and presented.  The fracture was opened and fibrous tissue was removed from within the fracture with a rongeur and curette.  After the fibrous tissue was removed a reduction was completed with lobster claw clamps.  The reduction was checked with fluoroscopic orthogonal views and the patient was found to be in near anatomic alignment.  A plate was then chosen, contoured, and placed on the lateral malleolus and pinned in place with olive wires.  The plate placement was checked with fluoroscopic orthogonal views and the plate was found to be in good anatomic alignment.  Screw holes were then made in the plate followed by measuring and placement of screws.  The olive wires were then removed and remaining screws were drilled measured and placed.  After all screws had been placed the reduction and plate placement were recheck with fluoroscopic orthogonal views and the plate placement was appropriate the fracture was near anatomically aligned.  The areas copiously irrigated and then a moist Ray-Nilam was placed in the incision.         Cannulated screws sites were then  identified with fluoroscopic orthogonal views.  Sharp incision was made over the anterior lateral tibia and a hemostat was utilized to dissect to the anterior lateral tibia.  The patient's ankle was then dorsiflexed reducing a posterior malleolus fracture and  then a guidewire was advanced from the anterolateral tibia across the tibia and into the posterior medial fragment under fluoroscopic orthogonal views.  Once this was in the appropriate position with near anatomic alignment of the fracture a 2nd pin was placed in a similar fashion paralleling the previous one.    the pins were then measured and appropriate length screws were chosen.  The anterior cortex was prepared with a countersink and then appropriate length screws were placed.  The wire was removed and the screw placement and reduction were checked with fluoroscopic orthogonal views and found to be in anatomic alignment with good placement of the screws.       Attention was then placed at the medial malleolus were sharp incision was made with a #15 blade.  Dissection was taken to the level of the medial malleolus while protecting vital structures a fracture was identified and freed of fibrous tissue.  A reduction was then completed and a pre contoured posterior medial distal tibia plate was then placed on the bone and held in place with olive wires.  The reduction and plate placement was then checked with fluoroscopic orthogonal views.  When the reduction was found to be near anatomic and the plate was in appropriate position screw holes were then drilled measured and appropriate length screws were placed.  After all the screws were placed the areas copiously irrigated.  Final fluoroscopic orthogonal views of all reductions and plate placement was completed.       The tourniquet was then released and full hemostasis was ensured.  The medial incision was then closed in layers with Vicryl suture with final closure with nylon suture in a horizontal mattress  fashion.  The anterior stab wounds were closed with nylon suture in a simple interrupted fashion.  The lateral incision was addressed and the Ray-Nilam was removed.  The area was copiously irrigated and then the incision was closed in layers with Vicryl suture with final closure with nylon suture in a horizontal mattress fashion.  After closure of the incisions the incisions were then dressed with Adaptic, sterile gauze, sterile cast padding, posterior mold plaster splint, and Ace wrap.       The patient was then awakened by anesthesia and transferred from the operating room to the recovery room in stable condition.  She tolerated the procedure well without complication.

## 2020-07-20 NOTE — DISCHARGE SUMMARY
Ms. Buck was admitted through same-day surgery and brought the operating room where an open reduction and internal fixation was completed on a right distal tibia pilon fracture and lateral malleolus fracture.  For full account of surgery please see the operative report.  Postoperatively she was transferred from the operating room to the recovery room and when alert awake and oriented was discharged home in stable condition with instructions to follow up in 10-12 days.

## 2020-07-20 NOTE — PLAN OF CARE
PT TO RECOVERY FROM OR , PT CONNECTED TO MONITOR, IV INTACT,AND INFUSING, DRESSING NOTED TO RIGHT LEG, ELEVATED WITH PILLOWS, ICE PACK TO BEHIND KNEE AND LEG ,VITALS STABLE, WILL CONTINUE TO MONITOR

## 2020-07-20 NOTE — TRANSFER OF CARE
"Anesthesia Transfer of Care Note    Patient: Mayda Buck    Procedure(s) Performed: Procedure(s) (LRB):  ORIF, FRACTURE, TIBIA (Right)  ORIF, FRACTURE, FIBULA, LATERAL MALLEOLUS (Right)    Patient location: PACU    Anesthesia Type: general    Transport from OR: Transported from OR on room air with adequate spontaneous ventilation    Post pain: pain needs to be addressed    Post assessment: no apparent anesthetic complications and tolerated procedure well    Post vital signs: stable    Level of consciousness: awake, alert and oriented    Nausea/Vomiting: no nausea/vomiting    Complications: none    Transfer of care protocol was followed      Last vitals:   Visit Vitals  /89 (BP Location: Right arm, Patient Position: Lying)   Pulse 67   Temp 36.6 °C (97.9 °F) (Oral)   Resp 17   Ht 5' 7" (1.702 m)   Wt 104.3 kg (230 lb)   LMP  (LMP Unknown)   SpO2 100%   Breastfeeding No   BMI 36.02 kg/m²     "

## 2020-07-20 NOTE — ANESTHESIA PREPROCEDURE EVALUATION
07/20/2020  Mayda Buck is a 25 y.o., female.    Anesthesia Evaluation    I have reviewed the Patient Summary Reports.    I have reviewed the Nursing Notes.    I have reviewed the Medications.     Review of Systems  Anesthesia Hx:  No problems with previous Anesthesia  Neg history of prior surgery. Denies Family Hx of Anesthesia complications.   Denies Personal Hx of Anesthesia complications.   Social:  Smoker    Hematology/Oncology:  Hematology Normal   Oncology Normal     EENT/Dental:EENT/Dental Normal   Cardiovascular:  Cardiovascular Normal     Pulmonary:  Pulmonary Normal    Renal/:  Renal/ Normal     Hepatic/GI:  Hepatic/GI Normal    Musculoskeletal:  Musculoskeletal Normal    Neurological:  Neurology Normal    Endocrine:  Endocrine Normal    Dermatological:  Skin Normal    Psych:  Psychiatric Normal           Physical Exam  General:  Well nourished    Airway/Jaw/Neck:  Airway Findings: Mouth Opening: Normal Tongue: Normal  General Airway Assessment: Adult  Mallampati: II  TM Distance: 4 - 6 cm        Eyes/Ears/Nose:  EYES/EARS/NOSE FINDINGS: Normal   Dental:  DENTAL FINDINGS: Normal   Chest/Lungs:  Chest/Lungs Clear    Heart/Vascular:  Heart Findings: Normal Heart murmur: negative Vascular Findings: Normal    Abdomen:  Abdomen Findings: Normal    Musculoskeletal:  Musculoskeletal Findings: Normal   Skin:  Skin Findings: Normal    Mental Status:  Mental Status Findings:         Anesthesia Plan  Type of Anesthesia, risks & benefits discussed:  Anesthesia Type:  general  Patient's Preference:   Intra-op Monitoring Plan: standard ASA monitors  Intra-op Monitoring Plan Comments:   Post Op Pain Control Plan:   Post Op Pain Control Plan Comments:   Induction:   IV  Beta Blocker:  Patient is not currently on a Beta-Blocker (No further documentation required).       Informed Consent: Patient  understands risks and agrees with Anesthesia plan.  Questions answered. Anesthesia consent signed with patient.  ASA Score: 2     Day of Surgery Review of History & Physical: I have interviewed and examined the patient. I have reviewed the patient's H&P dated:    H&P update referred to the provider.         Ready For Surgery From Anesthesia Perspective.

## 2020-07-20 NOTE — DISCHARGE INSTRUCTIONS
Having Arm Fracture Open Reduction and Internal Fixation (ORIF)  Open reduction and internal fixation (ORIF) is a type of treatment to fix a broken bone. It puts the pieces of a broken bone back together so they can heal. Open reduction means the bones are put back in place during surgery. Internal fixation means that special hardware is used to hold the bone pieces together. This helps the bone heal correctly. The procedure is done by an orthopedic surgeon. This is a doctor with special training in treating bone, joint, and muscle problems.  What to tell your healthcare provider  Make sure you tell the healthcare provider about all medicines you take, including over-the-counter medicines, such as aspirin. Tell him or her about all vitamins, herbs, and other supplements you take. Also tell the provider the last time you had something to eat or drink. And tell your healthcare provider if you:  · Have had any recent changes in your health, such as an infection or fever  · Are sensitive or allergic to any medicines, latex, tape, or anesthesia (local and general)  · Are pregnant or think you may be pregnant  Tests before your surgery  Before your surgery, you may need imaging tests. These may include ultrasound, X-rays, or MRI.  Getting ready for your surgery  ORIF often takes place as emergency surgery after an accident or injury. Youll have a physical exam. X-rays may be taken of your arm. You may also have other imaging tests. A healthcare provider will ask you questions. He or she will also check your heart and lungs.  In some cases, arm fracture ORIF is planned. You may need to stop taking some medicines before the procedure, such as blood thinners and aspirin. If you smoke, you may need to stop before your surgery. Smoking can delay healing. Talk with your healthcare provider if you need help to stop smoking.  Also make sure to:  · Ask a family member or friend to take you home from the hospital. You cannot  drive yourself.  · Plan some changes at home to help you recover. You may need help at home.  · Dont eat or drink after midnight the night before your surgery.  · Follow all other instructions from your healthcare provider.  You may be asked to sign a consent form that gives your permission to do the procedure. Read the form carefully. Ask questions if something is not clear.  On the day of surgery  Your surgeon will explain the details of your surgery. The preparation and surgery may take a couple of hours. In general, you can expect the following:  · You will likely have general anesthesia.This will prevent pain and make you sleep through the surgery. Or you may have regional anesthesia to numb the area and medicine to help you relax and sleep through the surgery.  · A healthcare provider watches your vital signs, like your heart rate and blood pressure, during the surgery.  · After cleaning the skin, your surgeon makes a cut (incision) through the skin and muscles of your arm. Or with some fractures, the surgeon makes an incision on the top of the shoulder.  · Your surgeon puts the pieces of your humerus back in place. This is the reduction.  · Next, your surgeon uses special screws, plates, wires, or nails to hold the bone pieces together. This is the fixation. It helps the bone heals correctly.  · The surgeon will make other repairs to the area as needed.  · The surgeon will close the layers of muscle and skin on your arm with stitches (sutures).  After your surgery  Talk with your surgeon about what you can expect after your surgery. You may go home the same day. Or you may stay overnight in the hospital. Before leaving the hospital, you will have X-rays taken of your arm. This is to check the repair.  You might have some fluid draining from your incision. This is normal. You will have some pain after the surgery. Your doctor will tell you what pain medicine you can take to help reduce the pain. Avoid certain  OTC medicines for pain as instructed. Some of these may interfere with bone healing. You can also use ice packs to help lessen pain and swelling.  You may be told to not move your arm for a while after your surgery. You may need to wear a splint for several weeks. You will get instructions about when and how you can move your arm. Your surgeon may also tell you to eat foods high in calcium and vitamin D to help with bone healing.  Follow-up care  Make sure to keep all of your follow-up appointments. You may need to have your stitches removed a week or so after your surgery.  You may have physical therapy to improve the strength and movement of your arm. The therapy may include treatments and exercises. The therapy improves your chances of a full recovery. Most people are able to return to all their normal activities within a few months.     When to call your healthcare provider  Call your health care provider right away if you have any of these:  · Fever of 100.4°F (38°C) or higher  · Redness, swelling, or fluid leaking from your incision that gets worse  · Pain that gets worse  · Loss of feeling in your arm or hand   Date Last Reviewed: 8/10/2015  © 2401-5065 [employer]. [address] [place] [address]. All rights reserved. This information is not intended as a substitute for professional medical care. Always follow your healthcare professional's instructions.        Discharge Instructions: After Your Surgery  Youve just had surgery. During surgery, you were given medicine called anesthesia to keep you relaxed and free of pain. After surgery, you may have some pain or nausea. This is common. Here are some tips for feeling better and getting well after surgery.     Stay on schedule with your medicine.   Going home  Your healthcare provider will show you how to take care of yourself when you go home. He or she will also answer your questions. Have an adult family member or friend drive you  home. For the first 24 hours after your surgery:  · Do not drive or use heavy equipment.  · Do not make important decisions or sign legal papers.  · Do not drink alcohol.  · Have someone stay with you, if needed. He or she can watch for problems and help keep you safe.  Be sure to go to all follow-up visits with your healthcare provider. And rest after your surgery for as long as your healthcare provider tells you to.  Coping with pain  If you have pain after surgery, pain medicine will help you feel better. Take it as told, before pain becomes severe. Also, ask your healthcare provider or pharmacist about other ways to control pain. This might be with heat, ice, or relaxation. And follow any other instructions your surgeon or nurse gives you.  Tips for taking pain medicine  To get the best relief possible, remember these points:  · Pain medicines can upset your stomach. Taking them with a little food may help.  · Most pain relievers taken by mouth need at least 20 to 30 minutes to start to work.  · Taking medicine on a schedule can help you remember to take it. Try to time your medicine so that you can take it before starting an activity. This might be before you get dressed, go for a walk, or sit down for dinner.  · Constipation is a common side effect of pain medicines. Call your healthcare provider before taking any medicines such as laxatives or stool softeners to help ease constipation. Also ask if you should skip any foods. Drinking lots of fluids and eating foods such as fruits and vegetables that are high in fiber can also help. Remember, do not take laxatives unless your surgeon has prescribed them.  · Drinking alcohol and taking pain medicine can cause dizziness and slow your breathing. It can even be deadly. Do not drink alcohol while taking pain medicine.  · Pain medicine can make you react more slowly to things. Do not drive or run machinery while taking pain medicine.  Your healthcare provider may  tell you to take acetaminophen to help ease your pain. Ask him or her how much you are supposed to take each day. Acetaminophen or other pain relievers may interact with your prescription medicines or other over-the-counter (OTC) medicines. Some prescription medicines have acetaminophen and other ingredients. Using both prescription and OTC acetaminophen for pain can cause you to overdose. Read the labels on your OTC medicines with care. This will help you to clearly know the list of ingredients, how much to take, and any warnings. It may also help you not take too much acetaminophen. If you have questions or do not understand the information, ask your pharmacist or healthcare provider to explain it to you before you take the OTC medicine.  Managing nausea  Some people have an upset stomach after surgery. This is often because of anesthesia, pain, or pain medicine, or the stress of surgery. These tips will help you handle nausea and eat healthy foods as you get better. If you were on a special food plan before surgery, ask your healthcare provider if you should follow it while you get better. These tips may help:  · Do not push yourself to eat. Your body will tell you when to eat and how much.  · Start off with clear liquids and soup. They are easier to digest.  · Next try semi-solid foods, such as mashed potatoes, applesauce, and gelatin, as you feel ready.  · Slowly move to solid foods. Dont eat fatty, rich, or spicy foods at first.  · Do not force yourself to have 3 large meals a day. Instead eat smaller amounts more often.  · Take pain medicines with a small amount of solid food, such as crackers or toast, to avoid nausea.     Call your surgeon if  · You still have pain an hour after taking medicine. The medicine may not be strong enough.  · You feel too sleepy, dizzy, or groggy. The medicine may be too strong.  · You have side effects like nausea, vomiting, or skin changes, such as rash, itching, or hives.        If you have obstructive sleep apnea  You were given anesthesia medicine during surgery to keep you comfortable and free of pain. After surgery, you may have more apnea spells because of this medicine and other medicines you were given. The spells may last longer than usual.   At home:  · Keep using the continuous positive airway pressure (CPAP) device when you sleep. Unless your healthcare provider tells you not to, use it when you sleep, day or night. CPAP is a common device used to treat obstructive sleep apnea.  · Talk with your provider before taking any pain medicine, muscle relaxants, or sedatives. Your provider will tell you about the possible dangers of taking these medicines.  Date Last Reviewed: 12/1/2016  © 1782-4471 [employer]. [address] [place] [address], [place] [address]. All rights reserved. This information is not intended as a substitute for professional medical care. Always follow your healthcare professional's instructions.

## 2020-07-20 NOTE — INTERVAL H&P NOTE
The patient has been examined and the H&P has been reviewed:  Operative extremity signed at 12:10 p.m..  H&P updated at 12:15 p.m..  Patient rate to roll to operating room at 12:15 p.m..    I concur with the findings and no changes have occurred since H&P was written.    Anesthesia/Surgery risks, benefits and alternative options discussed and understood by patient/family.          Active Hospital Problems    Diagnosis  POA    Displaced fracture of lateral malleolus of right fibula, initial encounter for closed fracture [S82.61XA]  Yes      Resolved Hospital Problems   No resolved problems to display.

## 2020-07-21 ENCOUNTER — TELEPHONE (OUTPATIENT)
Dept: ORTHOPEDICS | Facility: CLINIC | Age: 25
End: 2020-07-21

## 2020-07-21 ENCOUNTER — NURSE TRIAGE (OUTPATIENT)
Dept: ADMINISTRATIVE | Facility: CLINIC | Age: 25
End: 2020-07-21

## 2020-07-21 NOTE — TELEPHONE ENCOUNTER
"Pt had surgery to R ankle yesterday and had "soft" cast placed. Pt's  states pt is taking pain medication as prescribed without relief. Pt has also has been elevating and icing ankle. Pt able to wiggle toes. Pt advised per protocol to call surgeon now and pt verbalizes understanding.  No MD listed on call. Dr. Rasmussen called, no answer. Pt advised to ED and pt verbalizes understanding.     Reason for Disposition   [1] SEVERE post-op pain (e.g., excruciating, pain scale 8-10) AND [2] not controlled with pain medications   [1] SEVERE pain (e.g., excruciating, pain scale 8-10) under cast AND [2] not improved after pain medications and elevation    Additional Information   Negative: Sounds like a life-threatening emergency to the triager   Negative: [1] Widespread rash AND [2] bright red, sunburn-like   Negative: [1] SEVERE headache AND [2] after spinal (epidural) anesthesia   Negative: [1] Vomiting AND [2] persists > 4 hours   Negative: [1] Vomiting AND [2] abdomen looks much more swollen than usual   Negative: [1] Drinking very little AND [2] dehydration suspected (e.g., no urine > 12 hours, very dry mouth, very lightheaded)   Negative: Patient sounds very sick or weak to the triager   Negative: Sounds like a serious complication to the triager   Negative: Fever > 100.4 F (38.0 C)   Negative: Chest pain   Negative: Difficulty breathing    Protocols used: POST-OP SYMPTOMS AND [id number], CAST SYMPTOMS AND [id number]      "

## 2020-07-21 NOTE — ANESTHESIA POSTPROCEDURE EVALUATION
Anesthesia Post Evaluation    Patient: Mayda Buck    Procedure(s) Performed: Procedure(s) (LRB):  ORIF, FRACTURE, TIBIA (Right)  ORIF, FRACTURE, FIBULA, LATERAL MALLEOLUS (Right)    Final Anesthesia Type: general    Patient location during evaluation: PACU  Patient participation: Yes- Able to Participate  Level of consciousness: awake and awake and alert  Post-procedure vital signs: reviewed and stable  Pain management: adequate  Airway patency: patent    PONV status at discharge: No PONV  Anesthetic complications: no      Cardiovascular status: blood pressure returned to baseline  Respiratory status: unassisted and spontaneous ventilation  Hydration status: euvolemic  Follow-up not needed.          Vitals Value Taken Time   /83 07/20/20 1625   Temp 36.5 °C (97.7 °F) 07/20/20 1521   Pulse 91 07/20/20 1625   Resp 16 07/20/20 1625   SpO2 99 % 07/20/20 1625         Event Time   Out of Recovery 16:00:00         Pain/Jeovany Score: Pain Rating Prior to Med Admin: 7 (7/20/2020  4:08 PM)  Jeovany Score: 9 (7/20/2020  4:05 PM)  Modified Jeovany Score: 18 (7/20/2020  4:20 PM)

## 2020-07-21 NOTE — TELEPHONE ENCOUNTER
Spoke with patient she states that she is having 10/10 right leg pain.  States that she had surgery on her leg yesterday.  Patient states that pain medication is not helping and she has been unable to sleep.  Advised patient to go to ER and have another adult drive.  Patient was advised previously by another triage nurse to go to ER for leg pain.  Patient did not take advice as advised earlier today.  Patient verbalized that she would go to ER now.     Reason for Disposition   Nursing judgment    Protocols used: NO GUIDELINE OR REFERENCE [id number]

## 2020-07-21 NOTE — TELEPHONE ENCOUNTER
"Called patient to see how she was doing after surgery with Dr. Rasmussen yesterday and to verify post op appointment.  Patient stated, " I am in a lot of pain, I have not slept. Offered patient to come into the clinic today for Dr. Rasmussen to elevate her. Patient declined appointment. Encouraged patient to elevate above the heart, ice in 20 minute intervals and take motrin in between pain medication. Patient verbalized understanding. Encouraged patient to contact the clinic if she has any questions or concerns.  "

## 2020-07-22 ENCOUNTER — TELEPHONE (OUTPATIENT)
Dept: ORTHOPEDICS | Facility: CLINIC | Age: 25
End: 2020-07-22

## 2020-07-22 NOTE — TELEPHONE ENCOUNTER
"Called patient to see how she is doing this morning and if her pain was under control. Patient stated,"I went to [facility] ED and they prescribed me Oxycodone, which is helping control my pain more than the Norco." Asked patient if she was having any tingling or numbness in toes, patient stated," I have slight tingling in my toes." Encouraged patient to loosen the ace wrap, as it may be too tight, but not to take off the splint. Patient voiced understanding. Encouraged patient to contact the office if she has any questions or concerns.      Informed patient that Dr. Rasmussen will write her a prescription for Oxycodone. Informed patient it will be at the  at the [facility]. Patient verbalized understanding.      Offered patient to come into the clinic today for Dr. Rasmussen to elevate her. Patient denied appointment. Encouraged patient to contact the clinic if she has any questions or concerns.   "

## 2020-07-23 ENCOUNTER — TELEPHONE (OUTPATIENT)
Dept: ORTHOPEDICS | Facility: CLINIC | Age: 25
End: 2020-07-23

## 2020-07-23 RX ORDER — OXYCODONE AND ACETAMINOPHEN 7.5; 325 MG/1; MG/1
1 TABLET ORAL EVERY 4 HOURS PRN
COMMUNITY
End: 2021-09-07

## 2020-07-23 NOTE — TELEPHONE ENCOUNTER
Informed patient her prescription is ready for pickup at [facility]. Patient verbalized understanding.

## 2020-07-23 NOTE — TELEPHONE ENCOUNTER
----- Message from Dany Kate sent at 7/23/2020  8:52 AM CDT -----  Contact: pt  Type: Needs Medical Advice  Who Called:  pt    Pharmacy name and phone #:    [id number] - [place], [place] - [address]  [address]  [place] [address]  Phone: [phone] Fax: [phone]    Best Call Back Number: [phone]  Additional Information: pt needs to discuss her medication and switching the pain Rx. Please call to advise

## 2020-07-23 NOTE — TELEPHONE ENCOUNTER
Returned call to patient. Informed patient that Dr. Rasmussen will write her a prescription for Oxycodone. Informed patient it will be at the  at the [facility]. Patient verbalized understanding.     Offered patient to come into the clinic today for Dr. Rasmussen to elevate her. Patient denied appointment. Encouraged patient to contact the clinic if she has any questions or concerns.

## 2020-07-31 ENCOUNTER — OFFICE VISIT (OUTPATIENT)
Dept: ORTHOPEDICS | Facility: CLINIC | Age: 25
End: 2020-07-31
Payer: MEDICAID

## 2020-07-31 VITALS — HEIGHT: 67 IN | WEIGHT: 229.94 LBS | OXYGEN SATURATION: 100 % | BODY MASS INDEX: 36.09 KG/M2 | RESPIRATION RATE: 18 BRPM

## 2020-07-31 DIAGNOSIS — Z51.89 AFTER CARE: Primary | ICD-10-CM

## 2020-07-31 PROCEDURE — 99024 POSTOP FOLLOW-UP VISIT: CPT | Mod: ,,, | Performed by: ORTHOPAEDIC SURGERY

## 2020-07-31 PROCEDURE — 99999 PR PBB SHADOW E&M-EST. PATIENT-LVL III: CPT | Mod: PBBFAC,,, | Performed by: ORTHOPAEDIC SURGERY

## 2020-07-31 PROCEDURE — 99024 PR POST-OP FOLLOW-UP VISIT: ICD-10-PCS | Mod: ,,, | Performed by: ORTHOPAEDIC SURGERY

## 2020-07-31 PROCEDURE — 99213 OFFICE O/P EST LOW 20 MIN: CPT | Mod: PBBFAC,25 | Performed by: ORTHOPAEDIC SURGERY

## 2020-07-31 PROCEDURE — 99999 PR PBB SHADOW E&M-EST. PATIENT-LVL III: ICD-10-PCS | Mod: PBBFAC,,, | Performed by: ORTHOPAEDIC SURGERY

## 2020-07-31 PROCEDURE — 29405 APPL SHORT LEG CAST: CPT | Mod: PBBFAC | Performed by: ORTHOPAEDIC SURGERY

## 2020-07-31 PROCEDURE — 29405 PR APPLY SHORT LEG CAST: ICD-10-PCS | Mod: S$PBB,58,RT, | Performed by: ORTHOPAEDIC SURGERY

## 2020-07-31 PROCEDURE — 29405 APPL SHORT LEG CAST: CPT | Mod: S$PBB,58,RT, | Performed by: ORTHOPAEDIC SURGERY

## 2020-07-31 NOTE — PROGRESS NOTES
Subjective:      Patient ID: Mayda Buck is a 25 y.o. female.    Chief Complaint: Post-op Evaluation of the Right Ankle      HPI:  Ms. Buck returns today for 1st postop visit on open reduction and fixation of a distal tibia pilon/ankle fracture.  Her date of surgery 07/20/2020.  She originally injured her ankle on 07/05/2020 after she was traversing a recessed area in her home and twisted her ankle.    ROS:  New diagnosis/surgery/prescriptions since last office visit on 07/17/2020:  ORIF right distal pilon  tibia/ankle      Objective:      Physical Exam:   General: AAOx3.  No acute distress  Vascular:  Pulses intact and equal bilaterally.  Capillary refill less than 3 seconds and equal bilaterally  Neurologic:  Pinprick and soft touch intact and equal bilaterally  Integment:  No ecchymosis, no errythema.  Incisions well approximated and healing well.  Extremity:  Ankle:  Tender with dorsiflexion/plantar flexion within allowable motion.  No swelling.  Positive wrinkle sign.  No dehiscence of incisions.  Mild tenderness with palpation.  Good toe motion without pain.  Radiography:  No new x-rays done today.      Assessment:       Impression:  ORIF right distal tibia plafond/ankle.      Plan:       1.  Discussed physical examination with the patient. Mayda understands that she had an open reduction internal fixation of her right distal tibia/ankle.  2.  Place in a well-molded short-leg fiberglass cast.  3.  Cast shoe apply to the cast.  One was fitted to the patient cast.  4.  Percocet 5/325, 1 p.o. q.4-6 hours p.r.n. pain, dispense 20, refill 0.  The patient understands this will be the last prescription for narcotics.  5.  Ambulate with crutches or walker nonweightbearing to the right lower extremity.  6.  Cast care instructions were discussed with the patient.  7.  Keep cast clean and dry.  8.  [facility] portal was discussed with the patient and information was given.  The patient was encouraged to use the  portal for future encounters.  9.  Follow up in 1 month with an x-ray out of plaster right ankle.

## 2020-08-03 ENCOUNTER — TELEPHONE (OUTPATIENT)
Dept: ORTHOPEDICS | Facility: CLINIC | Age: 25
End: 2020-08-03

## 2020-08-03 NOTE — TELEPHONE ENCOUNTER
Returned call to patient. Informed patient I cannot make that call for her short term disability. If patient thinks she needs to do apply for short term disability, that is up to her. Patient asked if I could help her with her short term disability. Informed patient she would need to contact her employer to start her disability paperwork. Letter will be left at the  in [place] with patient restrictions.  Patient verbalized understanding.

## 2020-08-03 NOTE — TELEPHONE ENCOUNTER
----- Message from Suad Willoughby sent at 8/3/2020 10:50 AM CDT -----  Regarding: advice  Contact: pt  Advice     Patient called and asked for a call back she has been out of work since July 5th she is asking   If she should apply for short term disability  due to not being able to work. She is a traveling CNA   She is the sole income Provider.    Call back [phone]

## 2020-08-28 DIAGNOSIS — S82.871D CLOSED DISPLACED PILON FRACTURE OF RIGHT TIBIA WITH ROUTINE HEALING, SUBSEQUENT ENCOUNTER: Primary | ICD-10-CM

## 2020-09-01 ENCOUNTER — OFFICE VISIT (OUTPATIENT)
Dept: ORTHOPEDICS | Facility: CLINIC | Age: 25
End: 2020-09-01
Payer: MEDICAID

## 2020-09-01 ENCOUNTER — HOSPITAL ENCOUNTER (OUTPATIENT)
Dept: RADIOLOGY | Facility: HOSPITAL | Age: 25
Discharge: HOME | End: 2020-09-01
Attending: ORTHOPAEDIC SURGERY
Payer: MEDICAID

## 2020-09-01 VITALS
BODY MASS INDEX: 36.09 KG/M2 | HEART RATE: 97 BPM | HEIGHT: 67 IN | WEIGHT: 229.94 LBS | OXYGEN SATURATION: 100 % | RESPIRATION RATE: 18 BRPM

## 2020-09-01 DIAGNOSIS — S82.891D CLOSED RIGHT ANKLE FRACTURE, WITH ROUTINE HEALING, SUBSEQUENT ENCOUNTER: ICD-10-CM

## 2020-09-01 DIAGNOSIS — Z46.89 CAST REMOVAL: Primary | ICD-10-CM

## 2020-09-01 DIAGNOSIS — S82.871D CLOSED DISPLACED PILON FRACTURE OF RIGHT TIBIA WITH ROUTINE HEALING, SUBSEQUENT ENCOUNTER: ICD-10-CM

## 2020-09-01 DIAGNOSIS — Z98.890 HISTORY OF ANKLE SURGERY: ICD-10-CM

## 2020-09-01 PROCEDURE — 99024 PR POST-OP FOLLOW-UP VISIT: ICD-10-PCS | Mod: ,,, | Performed by: ORTHOPAEDIC SURGERY

## 2020-09-01 PROCEDURE — 99024 POSTOP FOLLOW-UP VISIT: CPT | Mod: ,,, | Performed by: ORTHOPAEDIC SURGERY

## 2020-09-01 PROCEDURE — 99213 OFFICE O/P EST LOW 20 MIN: CPT | Mod: PBBFAC,25,PN | Performed by: ORTHOPAEDIC SURGERY

## 2020-09-01 PROCEDURE — 73610 X-RAY EXAM OF ANKLE: CPT | Mod: 26,RT,, | Performed by: RADIOLOGY

## 2020-09-01 PROCEDURE — 73610 X-RAY EXAM OF ANKLE: CPT | Mod: TC,PN,RT

## 2020-09-01 PROCEDURE — 99999 PR PBB SHADOW E&M-EST. PATIENT-LVL III: CPT | Mod: PBBFAC,,, | Performed by: ORTHOPAEDIC SURGERY

## 2020-09-01 PROCEDURE — 99999 PR PBB SHADOW E&M-EST. PATIENT-LVL III: ICD-10-PCS | Mod: PBBFAC,,, | Performed by: ORTHOPAEDIC SURGERY

## 2020-09-01 PROCEDURE — 73610 XR ANKLE COMPLETE 3 VIEW RIGHT: ICD-10-PCS | Mod: 26,RT,, | Performed by: RADIOLOGY

## 2020-09-02 NOTE — PROGRESS NOTES
Subjective:      Patient ID: Mayda Buck is a 25 y.o. female.    Chief Complaint: Post-op Evaluation of the Right Ankle      HPI:  Ms. Buck returns today for approximately 6 weeks follow-up on open reduction internal fixation of a pilon fracture of the right distal tibia.  She had an open reduction internal fixation completed on 07/20/2020.  She has been in a cast since her last visit on 07/31/2020.  She originally injured her ankle on 07/05/2020 after she was traversing a recessed area in her in the living room of her home and twisted her ankle.    ROS:  No new diagnosis/surgery/prescriptions since last office visit on 07/31/2020  Constitution: Negative for chills and fever.   HENT: Negative for congestion.    Eyes: Negative for blurred vision.   Cardiovascular: Negative for chest pain and dyspnea on exertion.   Respiratory: Negative for cough and shortness of breath.    Endocrine: Negative for polydipsia.   Hematologic/Lymphatic: Negative for adenopathy.   Skin: Negative for flushing, itching, rash and skin cancer.   Musculoskeletal: Positive for joint pain and joint swelling. Negative for back pain and gout.   Gastrointestinal: Negative for anorexia, constipation, diarrhea and heartburn.   Genitourinary: Negative for bladder incontinence and nocturia.   Neurological: Positive for headaches. Negative for seizures.   Psychiatric/Behavioral: Positive for substance abuse. Negative for depression. The patient is not nervous/anxious.    Allergic/Immunologic: Negative for environmental allergies.       Objective:      Physical Exam:   General: AAOx3.  No acute distress  Vascular:  Pulses intact and equal bilaterally.  Capillary refill less than 3 seconds and equal bilaterally  Neurologic:  Pinprick and soft touch intact and equal bilaterally  Integment:  No ecchymosis, no errythema.  Incisions well healed.  Extremity:  Ankle:  Cast in fair condition.  With cast removed:  Dorsiflexion/plantar flexion decreased  compared to the contralateral extremity.  Inversion/eversion decreased compared to the chondral extremity.  No swelling.  Relatively nontender with palpation the patient's ankle.  Relatively nontender with motion of the patient's ankle. Calves soft.  Homans negative.  Radiography:  Personally reviewed x-rays of the right ankle completed on 09/01/2020 showed an anatomically aligned lateral malleolus and distal tibia fracture with plate and screws in place.  Early callus is present.      Assessment:       Impression:     1. Open reduction and internal fixation Pilion fracture right ankle.         Plan:       1.  Discussed physical examination and radiographic findings with the patient. Mayda understands that she appears to be doing well and is maintaining her fracture alignment postsurgical period she also appears to be healing her fracture so at this point she may be able to come out of a cast.  2.  Discontinue cast.  3.  Place in a Cam walker.  The patient brought 1 with her today.  She understands she must wear it at all times and only remove for hygiene for the next 2-3 weeks then she could remove it to sleep but must wear when she is up and about.  4.  Continue to ambulate with crutches or walker nonweightbearing to the right lower extremity.  5.  Any pain can be treated with over-the-counter medications dosed per box instructions.  6.  Follow up in 1 month with an x-ray of the right ankle, expected start the patient on a progressive weight-bearing program at that time.

## 2020-09-03 ENCOUNTER — TELEPHONE (OUTPATIENT)
Dept: ORTHOPEDICS | Facility: CLINIC | Age: 25
End: 2020-09-03

## 2020-09-03 NOTE — TELEPHONE ENCOUNTER
----- Message from Alicia Horta sent at 9/3/2020 11:08 AM CDT -----  Regarding: advice  Contact: HALINA MEREDITH [[id number]]  Patient is requesting a call back from the nurse stated her cast is off and she is experiencing a lot of leg pain.    Please call the patient upon request at phone number [phone].

## 2020-09-03 NOTE — TELEPHONE ENCOUNTER
Returned call to patient. Advised patient to elevate extremity, ice in 20 minute intervals 4-5 times a day and take over the counter medication. Informed patient the pain is normal, as she just had surgery 7/20/2020. Patient verbalized understanding. Offered patient to come into the clinic to be checked out. Patient denied appointment.

## 2020-09-25 DIAGNOSIS — S82.891D CLOSED RIGHT ANKLE FRACTURE, WITH ROUTINE HEALING, SUBSEQUENT ENCOUNTER: Primary | ICD-10-CM

## 2020-09-29 ENCOUNTER — OFFICE VISIT (OUTPATIENT)
Dept: ORTHOPEDICS | Facility: CLINIC | Age: 25
End: 2020-09-29
Payer: MEDICAID

## 2020-09-29 ENCOUNTER — HOSPITAL ENCOUNTER (OUTPATIENT)
Dept: RADIOLOGY | Facility: HOSPITAL | Age: 25
Discharge: HOME | End: 2020-09-29
Attending: ORTHOPAEDIC SURGERY
Payer: MEDICAID

## 2020-09-29 VITALS — TEMPERATURE: 98 F | OXYGEN SATURATION: 100 % | HEART RATE: 70 BPM

## 2020-09-29 DIAGNOSIS — S82.891D CLOSED RIGHT ANKLE FRACTURE, WITH ROUTINE HEALING, SUBSEQUENT ENCOUNTER: ICD-10-CM

## 2020-09-29 DIAGNOSIS — Z98.890 HISTORY OF ANKLE SURGERY: Primary | ICD-10-CM

## 2020-09-29 PROCEDURE — 99212 OFFICE O/P EST SF 10 MIN: CPT | Mod: PBBFAC,25,PN | Performed by: ORTHOPAEDIC SURGERY

## 2020-09-29 PROCEDURE — 99999 PR PBB SHADOW E&M-EST. PATIENT-LVL II: CPT | Mod: PBBFAC,,, | Performed by: ORTHOPAEDIC SURGERY

## 2020-09-29 PROCEDURE — 99999 PR PBB SHADOW E&M-EST. PATIENT-LVL II: ICD-10-PCS | Mod: PBBFAC,,, | Performed by: ORTHOPAEDIC SURGERY

## 2020-09-29 PROCEDURE — 73610 XR ANKLE COMPLETE 3 VIEW RIGHT: ICD-10-PCS | Mod: 26,RT,, | Performed by: RADIOLOGY

## 2020-09-29 PROCEDURE — 73610 X-RAY EXAM OF ANKLE: CPT | Mod: 26,RT,, | Performed by: RADIOLOGY

## 2020-09-29 PROCEDURE — 73610 X-RAY EXAM OF ANKLE: CPT | Mod: TC,PN,RT

## 2020-09-29 PROCEDURE — 99024 POSTOP FOLLOW-UP VISIT: CPT | Mod: ,,, | Performed by: ORTHOPAEDIC SURGERY

## 2020-09-29 PROCEDURE — 99024 PR POST-OP FOLLOW-UP VISIT: ICD-10-PCS | Mod: ,,, | Performed by: ORTHOPAEDIC SURGERY

## 2020-09-29 NOTE — PROGRESS NOTES
Subjective:      Patient ID: Mayda Buck is a 25 y.o. female.    Chief Complaint: Post-op Evaluation of the Right Ankle      HPI:  Ms. Buck returns today for approximately 10 week follow-up on open reduction internal fixation of a distal tibia pilon /ankle fracture.  Her date of surgery 07/20/2020. She originally injured her ankle on 07/05/2020 after she was traversing a recessed area in her in the living room of her home and twisted her ankle.  She stated that approximately 1 week ago she started ambulating with full weight-bearing.  She gets intermittent swelling in her ankle and some pain after ambulating.  She has been wearing her Cam Walker.     ROS:   No new diagnosis/ surgery/ prescriptions since last office visit on 09/01/2020.  Constitution: Negative for chills and fever.   HENT: Negative for congestion.    Eyes: Negative for blurred vision.   Cardiovascular: Negative for chest pain and dyspnea on exertion.   Respiratory: Negative for cough and shortness of breath.    Endocrine: Negative for polydipsia.   Hematologic/Lymphatic: Negative for adenopathy.   Skin: Negative for flushing, itching, rash and skin cancer.   Musculoskeletal: Positive for joint pain and joint swelling. Negative for back pain and gout.   Gastrointestinal: Negative for anorexia, constipation, diarrhea and heartburn.   Genitourinary: Negative for bladder incontinence and nocturia.   Neurological: Positive for headaches. Negative for seizures.   Psychiatric/Behavioral: Positive for substance abuse. Negative for depression. The patient is not nervous/anxious.    Allergic/Immunologic: Negative for environmental allergies.       Objective:      Physical Exam:   General: AAOx3.  No acute distress  Vascular:  Pulses intact and equal bilaterally.  Capillary refill less than 3 seconds and equal bilaterally  Neurologic:  Pinprick and soft touch intact and equal bilaterally  Integment:  No ecchymosis, no errythema .  Incisions well  approximated and well healed.  Extremity:   Ankle:  Mild decreased motion with dorsiflexion / plantar flexion.  Mild decreased motion with inversion /eversion.  Relatively nontender with ankle motion.  Relatively no swelling right ankle.  Nontender with palpation right ankle.  Calves soft.  Homans negative.  Radiography:  Personally reviewed  X-rays of the right ankle completed on 09/29/2020 showed a near anatomically aligned distal tibia pilon / lateral malleolus fracture with plate and screws in place with early callus.        Assessment:       Impression:   Distal tibia pilon /lateral malleolus fracture right ankle , status post ORIF.      Plan:       1.  Discussed physical examination and radiographic findings with the patient. Mayda understands that she   Is maintaining her fracture in anatomic alignment and appears to be healing.  It is not advantageous that she started ambulating with full weight-bearing prior to being told but she has not shifted her fractures and there is no signs of hardware failure.  2. Continue to ambulate with weight-bearing at tolerance to the right lower extremity.  3. Continue with Cam walker at all times when ambulating she may remove it for hygiene her sleep.  4. Refer to physical therapy to start an ankle rehab program.  5. Any pain can be treated with over-the-counter medications dosed per box instructions.  6. Start a home exercise program to include heel raises and drawn the alphabet.    7. Work restrictions include no lifting, standing and walking at tolerance.  8. Follow up in 6 weeks with an x-ray of the right ankle expect to return the patient to full unrestricted activities at follow-up.

## 2020-09-29 NOTE — LETTER
September 29, 2020      [facility]  [place] [facility]  [address], [address]  [place] [address]  Phone: [phone]  Fax: [phone]       Patient: Mayda Buck   YOB: 1995  Date of Visit: 09/29/2020    To Whom It May Concern:    Mayda Buck  was at [facility] being treated by Dr. Valery JEFFRIES on 09/29/2020. She may return to work  with restrictions of seated work, no lifting, walking as tolerated until follow up with provider.  If you have any questions or concerns, or if I can be of further assistance, please do not hesitate to contact me.    Sincerely,        Andreia Carnes LPN

## 2020-10-02 DIAGNOSIS — M25.572 ACUTE BILATERAL ANKLE PAIN: ICD-10-CM

## 2020-10-02 DIAGNOSIS — M25.571 ACUTE BILATERAL ANKLE PAIN: ICD-10-CM

## 2020-10-02 DIAGNOSIS — Z98.890 HISTORY OF ANKLE SURGERY: Primary | ICD-10-CM

## 2020-10-09 ENCOUNTER — DOCUMENTATION ONLY (OUTPATIENT)
Dept: REHABILITATION | Facility: HOSPITAL | Age: 25
End: 2020-10-09

## 2020-10-09 ENCOUNTER — CLINICAL SUPPORT (OUTPATIENT)
Dept: REHABILITATION | Facility: HOSPITAL | Age: 25
End: 2020-10-09
Attending: ORTHOPAEDIC SURGERY
Payer: MEDICAID

## 2020-10-09 DIAGNOSIS — M25.572 ACUTE BILATERAL ANKLE PAIN: ICD-10-CM

## 2020-10-09 DIAGNOSIS — Z98.890 HISTORY OF ANKLE SURGERY: ICD-10-CM

## 2020-10-09 DIAGNOSIS — M25.571 ACUTE BILATERAL ANKLE PAIN: ICD-10-CM

## 2020-10-09 DIAGNOSIS — R53.1 WEAKNESS: ICD-10-CM

## 2020-10-09 DIAGNOSIS — M25.674 JOINT STIFFNESS OF FOOT, RIGHT: ICD-10-CM

## 2020-10-09 PROCEDURE — 97140 MANUAL THERAPY 1/> REGIONS: CPT | Mod: PN

## 2020-10-09 PROCEDURE — 97110 THERAPEUTIC EXERCISES: CPT | Mod: PN

## 2020-10-09 PROCEDURE — 97161 PT EVAL LOW COMPLEX 20 MIN: CPT | Mod: PN

## 2020-10-09 NOTE — PLAN OF CARE
[facility]   Physical Therapy Initial Evaluation / Plan Of Care    Name: Mayda Buck  Clinic Number: [id number]    Therapy Diagnosis:   Encounter Diagnoses   Name Primary?    History of ankle surgery     Acute bilateral ankle pain      Physician: Nico Rasmussen DO    Physician Orders: PT Eval and Treat   Medical Diagnosis: ORIF right ankle  Evaluation Date: 10/9/2020  Authorization period Expiration: 12/31/20  Plan of Care Certification Period: 1/4/20    Visit #: 1/ Visits authorized: 12  Treatment Time In: 8:45 am  Treatment Time Out: 9:40 am    Precautions: per MD: Continue to ambulate with weight-bearing at tolerance to the right lower extremity. Continue with Cam walker at all times when ambulating she may remove it for hygiene and sleep.    Subjective   Date of onset: 7/5/20  Date of Surgery: 7/20/20    No past medical history on file.  Mayda Buck  has a past surgical history that includes ORIF tibia fracture (Right, 7/20/2020) and Open reduction and internal fixation (ORIF) of fracture of lateral malleolus (Right, 7/20/2020).    Mayda has a current medication list which includes the following prescription(s): cephalexin, hydrocodone-acetaminophen, and oxycodone-acetaminophen.    Review of patient's allergies indicates:  No Known Allergies     Imaging: X-ray taken on 9/29/20 revealed: 1. Prior ORIF of the distal tibia and fibula in alignment without evidence to suggest hardware failure. 2. Prior ORIF of the distal tibial metadiaphysis for healing subacute fracture of the posterior tubercle    Prior Therapy: none for current condition  Social History: Patient lives in a  home with no steps to enter   Occupation: CNA  Prior Level of Function: independent ADLs and IADLs  Current Level of Function: mod I with ADLs, limited functional mobility secondary to right ankle pain and swelling and ortho precautions including use of CAM at all times    Pain:  Current 8/10, worst  10/10, best 0/10   Location: right ankle  Description: Aching, Throbbing and Tight  Aggravating Factors: Standing, Walking, Night Time and moving her foot  Easing Factors: pain medication, rest and elevation    Onset/MANAN: sudden    History of current condition - Mayda reports she injured her ankle on 7/5/20 when stepping down into her recessed living room. She had bimalleolar ORIF on 7/20/20 and was placed in a posterior mold short-leg plaster splint, then in fiberglass cast 7/31/20. She has been NWB through her RLE until cleared by Dr Rasmussen for WBAT on 9/29/20. She presents in CAM walker ambulating without AD, WBAT RLE. She reports she had min to no reported pain last week but is hurting more this week, current pain level 8/10. She continues to have increased pain and swelling towards the end of the day and at night.    Pts goals: decrease pain and restore function    Objective     Observation: Patient is a well developed, well nourished female ambulating into clinic without AD in CAM walker RLE.  Posture: asymmetry in LE's secondary to CAM walker, slump sitting posture     Edema:  Fig 8: 59.5 cm  @ malleoli: 29 cm  @ metatarsals: 23 cm    Ankle Range of Motion: PROM   Ankle Left Right   Dorsiflexion WFL 0   Plantarflexion WFL 15   Inversion WFL 12   Eversion WFL 10     Right 1st toe flexion 30 degrees, extension 55 degrees    Strength:  Ankle Left Right   Dorsiflexion 5/5 2-/5   Plantarflexion 5/5 3/5   Inversion 5/5 2/5   Eversion 5/5 2/5     Special Tests: not performed secondary to surgical procedure and ortho precautions  Joint Mobility: hypomobility present secondary to surgical procedure performed and subsequent immobilization in CAM walker   Palpation: minimal tenderness to palpation at mid to distal lateral tibia  Sensation: intact  Flexibility:  90/90 SLR = R minimal restriction, L no restriction           Ely's test: R no restriction, L no restriction           Suresh's test: R no restriction, L no  "restriction           Manpreet test: R minimal restriction, L no restriction  LEFS: 82.5% LE impairment (14/80)     PT Evaluation Completed: Yes    TREATMENT     Mayda received therapeutic exercises to develop strength, endurance, ROM and flexibility for 15 minutes including:  Toe curls with towel 2 x 1 min  Ankle alphabet x 1  Seated DF/PF on rocker board 2 x 1 min  Seated heel slides x 5  Seated achilles stretch with towel, 10" h x 5  Seated heel/toe raises x 10    Mayda received the following manual therapy techniques: Joint mobilizations (hindfoot, forefoot, Soft tissue Mobilization and passive achilles stretch for 15 min  minutes.     Mayda to elevate and use ice pack to right ankle at home as needed for pain and swelling.    Home Exercises and Patient Education Provided    Education provided re:   - progress towards goals   - role of therapy in multi - disciplinary team, goals for therapy  Pt educated on condition, POC, and expectations in therapy.  No spiritual or educational barriers to learning provided    Home exercises: Pt will be provided HEP during course of treatment with progressions as appropriate. Pt was advised to perform these exercises free of pain, and to stop performing them if pain occurs.   Mayda demonstrated good  understanding of the education provided.     Assessment   Mayda is a 25 y.o. female referred to outpatient physical therapy with a medical diagnosis of bimalleolar ORIF, and presents to PT with CAM walker on RLE . Patient demonstrates limitations as described in the problem list. Pt will benefit from physcial therapy services in order to maximize pain free and/or functional use of right foot/ankle. The following goals were discussed with the patient and patient is in agreement with them as to be addressed in the treatment plan.   Pt prognosis is Good.   Pt will benefit from skilled outpatient Physical Therapy to address the deficits stated above and in the chart below, provide " pt/family education, and to maximize pt's level of independence.     Plan of care discussed with patient: Yes  Pt's spiritual, cultural and educational needs considered and pt agreeable to plan of care and goals as stated below:     Anticipated Barriers for therapy: none identified    Medical necessity is demonstrated by the following IMPAIRMENTS/PROBLEM LIST:  weakness, impaired endurance, impaired functional mobility, impaired balance, pain, decreased ROM, impaired joint extensibility, impaired muscle length and orthopedic precautions    Goals     Long Term Goals: 6 weeks  Pain: Decrease pain to 4/10 at max to allow for improved function   Strength: Improve strength in right hip to ankle muscles by 1/2 muscle grade for improved LE stability  ROM: Improve ROM to 90% of normal limits   Functional scale: Improve score on LEFS to 71.25% LE impairment  Stairs: [place] [facility] stairs in alternating gait pattern using 1 HR without pain or compensation   Walking: Increase walking distance/duration to 2 blocks without pain  Postures: Increase sitting and/or standing duration to 30 min without pain   Transfers: Perform all transfers without increased pain or limitation  Exercise: demonstrate independence with home exercise program to maintain gains made in therapy      Plan     Outpatient Physical Therapy 2 times weekly for 6 weeks to include the following interventions: patient education, Gait Training, Manual Therapy, Neuromuscular Re-ed, Therapeutic Activites, Therapeutic Exercise and modalities PRN indicated.   Pt may be seen by PTA as part of the rehabilitation team.     I certify the need for these services furnished under this plan of treatment and while under my care.    Lenin Dunham, PT        Attestation:   I have seen the patient, reviewed the therapist's plan of care, and I agree with the plan of care.   I certify the need for these services furnished under this plan of treatment and while under my care.          _______________            ________                                               _____________________  Physician/Referring Practitioner                                                            Date of Signature        PT met face to face with Jonathan Favre, PTA to discuss patient's treatment plan and progress towards established goals.  Treatment will be continued as described in initial report/eval and progress notes.  Patient will be seen by physical therapist every sixth visit and minimally once per month.

## 2020-10-12 ENCOUNTER — CLINICAL SUPPORT (OUTPATIENT)
Dept: REHABILITATION | Facility: HOSPITAL | Age: 25
End: 2020-10-12
Attending: ORTHOPAEDIC SURGERY
Payer: MEDICAID

## 2020-10-12 DIAGNOSIS — M25.674 JOINT STIFFNESS OF FOOT, RIGHT: ICD-10-CM

## 2020-10-12 DIAGNOSIS — R53.1 WEAKNESS: Primary | ICD-10-CM

## 2020-10-12 PROCEDURE — 97140 MANUAL THERAPY 1/> REGIONS: CPT | Mod: PN,CQ

## 2020-10-12 PROCEDURE — 97110 THERAPEUTIC EXERCISES: CPT | Mod: PN,CQ

## 2020-10-12 NOTE — PROGRESS NOTES
"                                                    Physical Therapy Daily Note     Name: Mayda Buck  Clinic Number: [id number]  Diagnosis:   Encounter Diagnoses   Name Primary?    Weakness Yes    Joint stiffness of foot, right      Physician: Nico Rasmussen,   Precautions: Standard; S/P ORIF R Ankle   Visit #: 2 of 12  PTA Visit #: 1  Time In: 8:45 AM  Time Out: 10:00 AM    Subjective     Pt reports: No new c/o's.  Pain Scale: Mayda rates pain on a scale of 0-10 to be 5 currently.    Objective     Mayda received individual therapeutic exercises to develop strength, endurance and ROM for 35 minutes including:  Nustep level 1 x 18 mins  Toe curls with towel x 3 min  Seated Windshield Wipers with towel x 3 mins  Ankle alphabet x 1  Seated DF/PF on rocker board x 3 mins  Seated heel slides x 3 mins  Seated achilles stretch with strap, 10" h x 10  Seated heel/toe raises x 20    Mayda received the following manual therapy techniques: Joint mobilizations were applied to the: (hindfoot, forefoot, Soft tissue Mobilization and passive achilles stretch for 15 minutes)     The patient received the following direct contact modalities after being cleared for contraindications:     The patient received the following supervised modalities after being cleared for contradictions:     Written Home Exercises Provided:   Pt demo good understanding of the education provided. Mayda demonstrated good return demonstration of activities.     Education provided re:  Mayda verbalized good understanding of education provided.   No spiritual or educational barriers to learning provided    Assessment     Patient tolerated treatment well; no increased symptoms noted; continue to progress as tolerated to improve strength and mobility.  This is a 25 y.o. female referred to outpatient physical therapy and presents with a medical diagnosis of bimalleolar ORIF, and presents to PT with CAM walker on RLE  and demonstrates limitations as " described in the problem list. Pt prognosis is Good. Pt will continue to benefit from skilled outpatient physical therapy to address the deficits listed in the problem list, provide pt/family education and to maximize pt's level of independence in the home and community environment.     LONG TERM GOALS:  Long Term Goals: 6 weeks  Pain: Decrease pain to 4/10 at max to allow for improved function   Strength: Improve strength in right hip to ankle muscles by 1/2 muscle grade for improved LE stability  ROM: Improve ROM to 90% of normal limits   Functional scale: Improve score on LEFS to 71.25% LE impairment  Stairs: [place] [facility] stairs in alternating gait pattern using 1 HR without pain or compensation   Walking: Increase walking distance/duration to 2 blocks without pain  Postures: Increase sitting and/or standing duration to 30 min without pain   Transfers: Perform all transfers without increased pain or limitation  Exercise: demonstrate independence with home exercise program to maintain gains made in therapy       Plan     Continue with established Plan of Care towards PT goals.    Therapist: Jonathan Favre, PTA  10/12/2020

## 2020-10-13 ENCOUNTER — TELEPHONE (OUTPATIENT)
Dept: ORTHOPEDICS | Facility: CLINIC | Age: 25
End: 2020-10-13

## 2020-10-13 NOTE — TELEPHONE ENCOUNTER
----- Message from Inez Dsouza sent at 10/13/2020  9:08 AM CDT -----  Contact: patient  Type: Needs Medical Advice  Who Called:  patient    Best Call Back Number: [phone] (home)     Additional Information: patient has questions regarding on going pain

## 2020-10-13 NOTE — TELEPHONE ENCOUNTER
Returned call to patient. Patient stated the pain in running up her leg and would like to get it check out. Appointment made. Patient verbalized understanding.

## 2020-10-14 DIAGNOSIS — Z98.890 HISTORY OF ANKLE SURGERY: Primary | ICD-10-CM

## 2020-10-16 ENCOUNTER — OFFICE VISIT (OUTPATIENT)
Dept: ORTHOPEDICS | Facility: CLINIC | Age: 25
End: 2020-10-16
Payer: MEDICAID

## 2020-10-16 ENCOUNTER — HOSPITAL ENCOUNTER (OUTPATIENT)
Dept: RADIOLOGY | Facility: HOSPITAL | Age: 25
Discharge: HOME | End: 2020-10-16
Attending: ORTHOPAEDIC SURGERY
Payer: MEDICAID

## 2020-10-16 ENCOUNTER — CLINICAL SUPPORT (OUTPATIENT)
Dept: REHABILITATION | Facility: HOSPITAL | Age: 25
End: 2020-10-16
Attending: ORTHOPAEDIC SURGERY
Payer: MEDICAID

## 2020-10-16 VITALS
HEART RATE: 82 BPM | TEMPERATURE: 98 F | WEIGHT: 229 LBS | HEIGHT: 67 IN | BODY MASS INDEX: 35.94 KG/M2 | OXYGEN SATURATION: 99 %

## 2020-10-16 DIAGNOSIS — M25.674 JOINT STIFFNESS OF FOOT, RIGHT: ICD-10-CM

## 2020-10-16 DIAGNOSIS — Z98.890 HISTORY OF ANKLE SURGERY: ICD-10-CM

## 2020-10-16 DIAGNOSIS — R53.1 WEAKNESS: Primary | ICD-10-CM

## 2020-10-16 DIAGNOSIS — Z98.890 HISTORY OF ANKLE SURGERY: Primary | ICD-10-CM

## 2020-10-16 PROCEDURE — 99024 POSTOP FOLLOW-UP VISIT: CPT | Mod: ,,, | Performed by: ORTHOPAEDIC SURGERY

## 2020-10-16 PROCEDURE — 73610 X-RAY EXAM OF ANKLE: CPT | Mod: TC,FY,RT

## 2020-10-16 PROCEDURE — 99213 OFFICE O/P EST LOW 20 MIN: CPT | Mod: PBBFAC,25 | Performed by: ORTHOPAEDIC SURGERY

## 2020-10-16 PROCEDURE — 99024 PR POST-OP FOLLOW-UP VISIT: ICD-10-PCS | Mod: ,,, | Performed by: ORTHOPAEDIC SURGERY

## 2020-10-16 PROCEDURE — 99999 PR PBB SHADOW E&M-EST. PATIENT-LVL III: ICD-10-PCS | Mod: PBBFAC,,, | Performed by: ORTHOPAEDIC SURGERY

## 2020-10-16 PROCEDURE — 97110 THERAPEUTIC EXERCISES: CPT | Mod: PN,CQ

## 2020-10-16 PROCEDURE — 99999 PR PBB SHADOW E&M-EST. PATIENT-LVL III: CPT | Mod: PBBFAC,,, | Performed by: ORTHOPAEDIC SURGERY

## 2020-10-16 PROCEDURE — 73610 XR ANKLE COMPLETE 3 VIEW RIGHT: ICD-10-PCS | Mod: 26,RT,, | Performed by: RADIOLOGY

## 2020-10-16 PROCEDURE — 97140 MANUAL THERAPY 1/> REGIONS: CPT | Mod: PN,CQ

## 2020-10-16 PROCEDURE — 73610 X-RAY EXAM OF ANKLE: CPT | Mod: 26,RT,, | Performed by: RADIOLOGY
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
## 2020-10-17 NOTE — PROGRESS NOTES
Subjective:      Patient ID: Mayda Buck is a 25 y.o. female.    Chief Complaint: Post-op Evaluation of the Right Ankle      HPI:  Ms. Buck returns today for re-evaluation of her right ankle.  She had an open reduction internal fixation of a distal tibia P line fracture/right ankle fracture on 07/20/2020.  She is now almost 3 months postop.  She arrived today stating that she has been bearing full weight in her CAM walker and has some pain.  She gets some aching at night when sleeping.  She has not started doing her home exercises such as heel raises.    ROS:  No new diagnosis/surgery/prescriptions since last office visit on 09/29/2020.  Constitution: Negative for chills and fever.   HENT: Negative for congestion.    Eyes: Negative for blurred vision.   Cardiovascular: Negative for chest pain and dyspnea on exertion.   Respiratory: Negative for cough and shortness of breath.    Endocrine: Negative for polydipsia.   Hematologic/Lymphatic: Negative for adenopathy.   Skin: Negative for flushing, itching, rash and skin cancer.   Musculoskeletal: Positive for joint pain and joint swelling. Negative for back pain and gout.   Gastrointestinal: Negative for anorexia, constipation, diarrhea and heartburn.   Genitourinary: Negative for bladder incontinence and nocturia.   Neurological: Positive for headaches. Negative for seizures.   Psychiatric/Behavioral: Positive for substance abuse. Negative for depression. The patient is not nervous/anxious.    Allergic/Immunologic: Negative for environmental allergies.       Objective:      Physical Exam:   General: AAOx3.  No acute distress  Vascular:  Pulses intact and equal bilaterally.  Capillary refill less than 3 seconds and equal bilaterally  Neurologic:  Pinprick and soft touch intact and equal bilaterally  Integment:  No ecchymosis, no errythema.  Incisions well healed.  Extremity:  Ankle:  Dorsiflexion/plantar flexion right ankle 15/15 degrees, left ankle 22/28°  minimal tenderness with motion right ankle relatively no swelling both ankles.  Good motion the patient's toes.  Nontender with palpation this manuelito interval.  Nontender with palpation Achilles insertion on the calcaneus.  Achilles palpable throughout full distribution.  Radiography:  Personally reviewed x-rays of the right ankle completed on 10/16/2020 shows an anatomically aligned distal tibia and lateral malleolus fractures with plate and screws in place with good callus.      Assessment:       Impression:  Healing distal tibiapilon fracture with lateral malleolus fracture, right ankle, status post ORIF.      Plan:       1.  Discussed physical examination and radiographic findings with the patient. Mayda understands that she has near completely healed the fracture of her tibia and fibula and should be at full weight-bearing and doing home exercises was such as heel raises as shown.  2.  Discontinue CAM walker.  3.  Swede-O brace right ankle, 1 was fitted to the patient.  4.  Continue with physical therapy through current prescription and then once it is completed discharged HEP.  5.  Continue with home exercises the patient understands she should be aggressive with heel raises.  6.  Any pain can be treated with over-the-counter medications dosed per box instructions.  7.  Follow up in approximately 6 weeks with an x-ray of the right ankle.

## 2020-10-26 ENCOUNTER — CLINICAL SUPPORT (OUTPATIENT)
Dept: REHABILITATION | Facility: HOSPITAL | Age: 25
End: 2020-10-26
Attending: ORTHOPAEDIC SURGERY
Payer: MEDICAID

## 2020-10-26 DIAGNOSIS — M25.674 JOINT STIFFNESS OF FOOT, RIGHT: ICD-10-CM

## 2020-10-26 DIAGNOSIS — R53.1 WEAKNESS: Primary | ICD-10-CM

## 2020-10-26 PROCEDURE — 97110 THERAPEUTIC EXERCISES: CPT | Mod: PN,CQ

## 2020-10-26 PROCEDURE — 97140 MANUAL THERAPY 1/> REGIONS: CPT | Mod: PN,CQ

## 2020-10-26 NOTE — PROGRESS NOTES
"                                                    Physical Therapy Daily Note     Name: Mayda Buck  Clinic Number: [id number]  Diagnosis:   Encounter Diagnoses   Name Primary?    Weakness Yes    Joint stiffness of foot, right      Physician: Nico Rasmussen,   Precautions: Standard; S/P ORIF R Ankle   Visit #: 4 of 12  PTA Visit #: 3  Time In: 8:35 AM  Time Out: 9:55 AM    Subjective     Pt reports: No new c/o's.   Pain Scale: Mayda rates pain on a scale of 0-10 to be 10 currently.    Objective     Mayda received individual therapeutic exercises to develop strength, endurance and ROM for 35 minutes including:  Nustep level 3 x 25 mins  Toe curls with towel x 3 min  Seated Windshield Wipers with towel x 3 mins  Ankle alphabet x 3  Seated DF/PF on rocker board x 3 mins  Seated heel slides x 3 mins  Seated achilles stretch with strap, 10" h x 10  Seated heel/toe raises x 20    Mayda received the following manual therapy techniques: Joint mobilizations were applied to the: (hindfoot, forefoot, Soft tissue Mobilization and passive achilles stretch for 15 minutes)     The patient received the following direct contact modalities after being cleared for contraindications:     The patient received the following supervised modalities after being cleared for contradictions:     Written Home Exercises Provided:   Pt demo good understanding of the education provided. Mayda demonstrated good return demonstration of activities.     Education provided re:  Mayda verbalized good understanding of education provided.   No spiritual or educational barriers to learning provided    Assessment     Patient tolerated treatment well; no increased symptoms noted; continue to progress as tolerated to improve strength and mobility.  This is a 25 y.o. female referred to outpatient physical therapy and presents with a medical diagnosis of bimalleolar ORIF, and presents to PT with CAM walker on RLE  and demonstrates limitations as " described in the problem list. Pt prognosis is Good. Pt will continue to benefit from skilled outpatient physical therapy to address the deficits listed in the problem list, provide pt/family education and to maximize pt's level of independence in the home and community environment.     LONG TERM GOALS:  Long Term Goals: 6 weeks  Pain: Decrease pain to 4/10 at max to allow for improved function   Strength: Improve strength in right hip to ankle muscles by 1/2 muscle grade for improved LE stability  ROM: Improve ROM to 90% of normal limits   Functional scale: Improve score on LEFS to 71.25% LE impairment  Stairs: [facility] stairs in alternating gait pattern using 1 HR without pain or compensation   Walking: Increase walking distance/duration to 2 blocks without pain  Postures: Increase sitting and/or standing duration to 30 min without pain   Transfers: Perform all transfers without increased pain or limitation  Exercise: demonstrate independence with home exercise program to maintain gains made in therapy       Plan     Continue with established Plan of Care towards PT goals.    Therapist: Jonathan Favre, PTA  10/26/2020

## 2020-11-11 DIAGNOSIS — Z98.890 HISTORY OF ANKLE SURGERY: Primary | ICD-10-CM

## 2020-11-12 ENCOUNTER — HOSPITAL ENCOUNTER (OUTPATIENT)
Dept: RADIOLOGY | Facility: HOSPITAL | Age: 25
Discharge: HOME | End: 2020-11-12
Attending: ORTHOPAEDIC SURGERY
Payer: MEDICAID

## 2020-11-12 ENCOUNTER — OFFICE VISIT (OUTPATIENT)
Dept: ORTHOPEDICS | Facility: CLINIC | Age: 25
End: 2020-11-12
Payer: MEDICAID

## 2020-11-12 VITALS
WEIGHT: 229 LBS | HEART RATE: 78 BPM | RESPIRATION RATE: 18 BRPM | BODY MASS INDEX: 35.94 KG/M2 | HEIGHT: 67 IN | OXYGEN SATURATION: 100 % | TEMPERATURE: 99 F

## 2020-11-12 DIAGNOSIS — Z98.890 HISTORY OF ANKLE SURGERY: ICD-10-CM

## 2020-11-12 DIAGNOSIS — S82.391D CLOSED INTRA-ARTICULAR FRACTURE OF DISTAL TIBIA, RIGHT, WITH ROUTINE HEALING, SUBSEQUENT ENCOUNTER: Primary | ICD-10-CM

## 2020-11-12 PROCEDURE — 73610 X-RAY EXAM OF ANKLE: CPT | Mod: 26,RT,, | Performed by: RADIOLOGY

## 2020-11-12 PROCEDURE — 99213 OFFICE O/P EST LOW 20 MIN: CPT | Mod: S$PBB,,, | Performed by: ORTHOPAEDIC SURGERY

## 2020-11-12 PROCEDURE — 73610 X-RAY EXAM OF ANKLE: CPT | Mod: TC,PN,RT

## 2020-11-12 PROCEDURE — 99999 PR PBB SHADOW E&M-EST. PATIENT-LVL III: ICD-10-PCS | Mod: PBBFAC,,, | Performed by: ORTHOPAEDIC SURGERY

## 2020-11-12 PROCEDURE — 99999 PR PBB SHADOW E&M-EST. PATIENT-LVL III: CPT | Mod: PBBFAC,,, | Performed by: ORTHOPAEDIC SURGERY

## 2020-11-12 PROCEDURE — 99213 PR OFFICE/OUTPT VISIT, EST, LEVL III, 20-29 MIN: ICD-10-PCS | Mod: S$PBB,,, | Performed by: ORTHOPAEDIC SURGERY

## 2020-11-12 PROCEDURE — 99213 OFFICE O/P EST LOW 20 MIN: CPT | Mod: PBBFAC,25,PN | Performed by: ORTHOPAEDIC SURGERY

## 2020-11-12 PROCEDURE — 73610 XR ANKLE COMPLETE 3 VIEW RIGHT: ICD-10-PCS | Mod: 26,RT,, | Performed by: RADIOLOGY

## 2020-11-12 NOTE — PROGRESS NOTES
Patient ID: Mayda Buck is a 25 y.o. female.     Chief Complaint: Post-op Evaluation of the Right Ankle        HPI:  Ms. Buck returns today for near 4 month follow-up on an open reduction internal fixation of a right distal tibia pilon fracture with ORIF of the lateral malleolus..   Her date of surgery was 07/20/2020.  She has been going to physical therapy.  She stated when she goes to work she gets some swelling in her ankle.  At this point she stated she is looking for some seated work to transition into.  She has been doing physical therapy.  She intermittently wears the [employer]-O brace.     ROS:  No new diagnosis/surgery/prescriptions since last office visit on 10/16/2020.  Constitution: Negative for chills and fever.   HENT: Negative for congestion.    Eyes: Negative for blurred vision.   Cardiovascular: Negative for chest pain and dyspnea on exertion.   Respiratory: Negative for cough and shortness of breath.    Endocrine: Negative for polydipsia.   Hematologic/Lymphatic: Negative for adenopathy.   Skin: Negative for flushing, itching, rash and skin cancer.   Musculoskeletal: Positive for joint pain and joint swelling. Negative for back pain and gout.   Gastrointestinal: Negative for anorexia, constipation, diarrhea and heartburn.   Genitourinary: Negative for bladder incontinence and nocturia.   Neurological: Positive for headaches. Negative for seizures.   Psychiatric/Behavioral: Positive for substance abuse. Negative for depression. The patient is not nervous/anxious.    Allergic/Immunologic: Negative for environmental allergies.       Objective:   Physical Exam:   General: AAOx3.  No acute distress  Vascular:  Pulses intact and equal bilaterally.  Capillary refill less than 3 seconds and equal bilaterally  Neurologic:  Pinprick and soft touch intact and equal bilaterally  Integment:  No ecchymosis, no errythema.  Incisions well healed.  Extremity:  Ankle:  Dorsiflexion/plantar flexion right  ankle 15/15 degrees, left ankle 22/28° minimal tenderness with motion right ankle.  Relatively no swelling both ankles.  Good motion the patient's toes.  Nontender with palpation Lisfranc interval.  Nontender with palpation Achilles insertion on the calcaneus.  Achilles palpable throughout full distribution.  Radiography:  Personally reviewed x-rays of the right ankle completed on 11/12/2020 showed a healed anatomically aligned distal tibia and lateral malleolus fractures with plate and screws in place.      Assessment:       Impression:  Healed distal tibia pilon fracture with lateral malleolus fracture, right ankle, status post ORIF.      Plan:       1.  Discussed physical examination and radiographic findings with the patient. Mayda understands that she has healed the fracture of her tibia and fibula.  She does have some arthrofibrosis which can resolve with aggressive home motion exercising.  2.  Continue with [employer]-O brace..  3.  Recommend she purchase a double wrap ankle brace to wear when she has to be up for extended periods.  4.  Finish current physical therapy prescription and discharged HEP..  5.  Continue with home exercises to include ankle motion exercises.  These were discussed with the patient.  She also understands she should ride a stationary bike and adjust the sees to different heights and place her feet in stirrups while riding to ensure that she gets full motion to her ankles..  6.  Any pain can be treated with over-the-counter medications dosed per box instructions.  7.  Follow up p.r.n...

## 2020-11-13 ENCOUNTER — CLINICAL SUPPORT (OUTPATIENT)
Dept: REHABILITATION | Facility: HOSPITAL | Age: 25
End: 2020-11-13
Attending: ORTHOPAEDIC SURGERY
Payer: MEDICAID

## 2020-11-13 DIAGNOSIS — M25.674 JOINT STIFFNESS OF FOOT, RIGHT: ICD-10-CM

## 2020-11-13 DIAGNOSIS — R53.1 WEAKNESS: ICD-10-CM

## 2020-11-13 PROCEDURE — 97110 THERAPEUTIC EXERCISES: CPT | Mod: PN

## 2020-11-13 PROCEDURE — 97140 MANUAL THERAPY 1/> REGIONS: CPT | Mod: PN

## 2020-11-13 NOTE — PROGRESS NOTES
"                                                    Physical Therapy Daily Note     Name: Mayda Buck  Clinic Number: [id number]  Diagnosis:   Encounter Diagnoses   Name Primary?    Weakness     Joint stiffness of foot, right      Physician: Nico Rasmussen,   Precautions: Standard; S/P ORIF R Ankle   Visit #: 5 of 12  PTA Visit #: 0  Time In: 8:45 AM  Time Out: 9:45 AM    Subjective     Pt reports: She when she is unable to wear a regular shoe during the day due to swelling she so puts her boot on. She also reports she feels like her balance is off and   Pain Scale: Mayda rates pain on a scale of 0-10 to be 10 currently.    Objective     Mayda received individual therapeutic exercises to develop strength, endurance and ROM for 32 minutes including:  Nustep level 5 x 20 mins / *will transition to recumbent next visit*   Standing PF/DF and IN/EV on rocker board in // bars x 3 min  Toe curls with towel x 2 min  Standing B heal raises x 2 min  Seated Windshield Wipers with towel x 3 mins HEP  Ankle alphabet x 3 HEP  Standing achilles stretch on wedge 10" h x 10    Mayda received the following manual therapy techniques: Joint mobilization to the hindfoot and forefoot, IASTM/STM to achilles and gastroc, passive achilles stretch x 18 minutes     Written Home Exercises Provided: standing gastroc and soleus stretch w 10" h x 5 BID, 4-way ankle strengthening w RTB and B heel raises, 3 x 10 reps to be performed BID, written instructions and RTB provided  Pt demo good understanding of the education provided demonstrated good return of activities.     Education provided re: ice packs and elevation as needed for pain and swelling  Mayda verbalized good understanding of education provided.   No spiritual or educational barriers to learning provided    Assessment     Mayda progressed with WB exercises as noted above without complication although apprehensive. CAM walker was discontinued on 10/16/20 per Dr Rasmussen, " patient was strongly encouraged to wean out of boot and wear ankle brace for support. She has ROM deficits affecting gait and functional mobility however demonstrating improvement in both upon departure. PT emphasized importance of HEP and attendance for carryover between treament sessions and progress towards goals. Will continue with progressive strengthening and proprioceptive exercises as tolerated.    Pt prognosis is Good. Pt will continue to benefit from skilled outpatient physical therapy to address the deficits listed in the problem list, provide pt/family education and to maximize pt's level of independence in the home and community environment.      Plan     Continue with established Plan of Care towards PT goals.    Therapist: Lenin Dunham, PT  11/13/2020

## 2020-11-16 ENCOUNTER — CLINICAL SUPPORT (OUTPATIENT)
Dept: REHABILITATION | Facility: HOSPITAL | Age: 25
End: 2020-11-16
Attending: ORTHOPAEDIC SURGERY
Payer: MEDICAID

## 2020-11-16 DIAGNOSIS — R53.1 WEAKNESS: Primary | ICD-10-CM

## 2020-11-16 DIAGNOSIS — M25.674 JOINT STIFFNESS OF FOOT, RIGHT: ICD-10-CM

## 2020-11-16 PROCEDURE — 97110 THERAPEUTIC EXERCISES: CPT | Mod: PN,CQ

## 2020-11-16 NOTE — PROGRESS NOTES
"                                                    Physical Therapy Daily Note     Name: Mayda Buck  Clinic Number: [id number]  Diagnosis:   Encounter Diagnoses   Name Primary?    Weakness Yes    Joint stiffness of foot, right      Physician: Nico Rasmussen,   Precautions: Standard; S/P ORIF R Ankle   Visit #: 6 of 12  PTA Visit #: 1  Time In: 8:30 AM  Time Out: 9:30 AM    Subjective     Pt reports: No new c/o's.   Pain Scale: Mayda rates pain on a scale of 0-10 to be 0 currently.    Objective     Mayda received individual therapeutic exercises to develop strength, endurance and ROM for 35 minutes including:  Recumbent Bike level 5 x 20 min  Standing PF/DF and IN/EV on rocker board in // bars x 3 min  Toe Taps x 3 min on 6" step  Toe curls with towel x 3 min  Standing B heal raises x 20  Seated Windshield Wipers with towel x 3 min  Seated Heel Slides x 3 min  Ankle alphabet x 3 HEP  Standing achilles stretch on wedge 10" h x 10    DNP  Mayda received the following manual therapy techniques: Joint mobilization to the hindfoot and forefoot, IASTM/STM to achilles and gastroc, passive achilles stretch x 18 minutes     Written Home Exercises Provided: standing gastroc and soleus stretch w 10" h x 5 BID, 4-way ankle strengthening w RTB and B heel raises, 3 x 10 reps to be performed BID, written instructions and RTB provided  Pt demo good understanding of the education provided demonstrated good return of activities.     Education provided re: ice packs and elevation as needed for pain and swelling  Akikhushboo verbalized good understanding of education provided.   No spiritual or educational barriers to learning provided    Assessment     Mayda progressed with WB exercises as noted above without complication although apprehensive. CAM walker was discontinued on 10/16/20 per Dr Rasmussen, patient was strongly encouraged to wean out of boot and wear ankle brace for support. She has ROM deficits affecting gait and " functional mobility however demonstrating improvement in both upon departure. PT emphasized importance of HEP and attendance for carryover between treament sessions and progress towards goals. Will continue with progressive strengthening and proprioceptive exercises as tolerated.    Pt prognosis is Good. Pt will continue to benefit from skilled outpatient physical therapy to address the deficits listed in the problem list, provide pt/family education and to maximize pt's level of independence in the home and community environment.      Plan     Continue with established Plan of Care towards PT goals.    Therapist: Jonathan Favre, PTA  11/16/2020

## 2020-11-20 ENCOUNTER — CLINICAL SUPPORT (OUTPATIENT)
Dept: REHABILITATION | Facility: HOSPITAL | Age: 25
End: 2020-11-20
Attending: ORTHOPAEDIC SURGERY
Payer: MEDICAID

## 2020-11-20 DIAGNOSIS — R53.1 WEAKNESS: Primary | ICD-10-CM

## 2020-11-20 DIAGNOSIS — M25.674 JOINT STIFFNESS OF FOOT, RIGHT: ICD-10-CM

## 2020-11-20 PROCEDURE — 97110 THERAPEUTIC EXERCISES: CPT | Mod: PN,CQ

## 2020-11-20 NOTE — PROGRESS NOTES
"                                                    Physical Therapy Daily Note     Name: Mayad Buck  Clinic Number: [id number]  Diagnosis:   Encounter Diagnoses   Name Primary?    Weakness Yes    Joint stiffness of foot, right      Physician: Nico Rasmussen,   Precautions: Standard; S/P ORIF R Ankle   Visit #: 7 of 12  PTA Visit #: 2  Time In: 8:40 AM  Time Out: 9:45 AM    Subjective     Pt reports: No new c/o's.   Pain Scale: Mayda rates pain on a scale of 0-10 to be 2 currently.    Objective     Mayda received individual therapeutic exercises to develop strength, endurance and ROM for 45 minutes including:  Recumbent Bike level 5 x 20 min  Standing PF/DF and IN/EV on rocker board in // bars x 3 min  Toe Taps x 3 min on 6" step  Toe curls with towel x 3 min  Standing B heel raises x 20  Seated Windshield Wipers with towel x 3 min  Seated Heel Slides x 3 min  Ankle alphabet x 3   Standing achilles stretch on wedge 3 x 1 min    DNP  Mayda received the following manual therapy techniques: Joint mobilization to the hindfoot and forefoot, IASTM/STM to achilles and gastroc, passive achilles stretch x 18 minutes     Written Home Exercises Provided: standing gastroc and soleus stretch w 10" h x 5 BID, 4-way ankle strengthening w RTB and B heel raises, 3 x 10 reps to be performed BID, written instructions and RTB provided  Pt demo good understanding of the education provided demonstrated good return of activities.     Education provided re: ice packs and elevation as needed for pain and swelling  Akikhushboo verbalized good understanding of education provided.   No spiritual or educational barriers to learning provided    Assessment     Mayda progressed with WB exercises as noted above without complication although apprehensive. CAM walker was discontinued on 10/16/20 per Dr Rasmussen, patient was strongly encouraged to wean out of boot and wear ankle brace for support. She has ROM deficits affecting gait and " functional mobility however demonstrating improvement in both upon departure. PT emphasized importance of HEP and attendance for carryover between treament sessions and progress towards goals. Will continue with progressive strengthening and proprioceptive exercises as tolerated.    Pt prognosis is Good. Pt will continue to benefit from skilled outpatient physical therapy to address the deficits listed in the problem list, provide pt/family education and to maximize pt's level of independence in the home and community environment.      Plan     Continue with established Plan of Care towards PT goals.    Therapist: Jonathan Favre, PTA  11/20/2020

## 2021-09-07 ENCOUNTER — HOSPITAL ENCOUNTER (EMERGENCY)
Facility: HOSPITAL | Age: 26
Discharge: HOME | End: 2021-09-07
Payer: MEDICAID

## 2021-09-07 VITALS
RESPIRATION RATE: 20 BRPM | BODY MASS INDEX: 34.53 KG/M2 | OXYGEN SATURATION: 99 % | TEMPERATURE: 98 F | HEART RATE: 60 BPM | SYSTOLIC BLOOD PRESSURE: 131 MMHG | DIASTOLIC BLOOD PRESSURE: 79 MMHG | WEIGHT: 220 LBS | HEIGHT: 67 IN

## 2021-09-07 DIAGNOSIS — K02.9 DENTAL CARIES: Primary | ICD-10-CM

## 2021-09-07 DIAGNOSIS — K08.89 PAIN, DENTAL: ICD-10-CM

## 2021-09-07 PROCEDURE — 99284 EMERGENCY DEPT VISIT MOD MDM: CPT

## 2021-09-07 RX ORDER — HYDROCODONE BITARTRATE AND ACETAMINOPHEN 5; 325 MG/1; MG/1
1 TABLET ORAL EVERY 12 HOURS PRN
Qty: 10 TABLET | Refills: 0 | Status: SHIPPED | OUTPATIENT
Start: 2021-09-07 | End: 2022-11-30

## 2021-09-07 RX ORDER — AMOXICILLIN AND CLAVULANATE POTASSIUM 875; 125 MG/1; MG/1
1 TABLET, FILM COATED ORAL 2 TIMES DAILY
Qty: 14 TABLET | Refills: 0 | Status: SHIPPED | OUTPATIENT
Start: 2021-09-07 | End: 2022-11-30

## 2022-11-30 ENCOUNTER — LAB VISIT (OUTPATIENT)
Dept: LAB | Facility: CLINIC | Age: 27
End: 2022-11-30
Payer: MEDICAID

## 2022-11-30 ENCOUNTER — OFFICE VISIT (OUTPATIENT)
Dept: OBSTETRICS AND GYNECOLOGY | Facility: CLINIC | Age: 27
End: 2022-11-30
Payer: MEDICAID

## 2022-11-30 ENCOUNTER — PATIENT MESSAGE (OUTPATIENT)
Dept: OBSTETRICS AND GYNECOLOGY | Facility: CLINIC | Age: 27
End: 2022-11-30

## 2022-11-30 VITALS
HEIGHT: 68 IN | SYSTOLIC BLOOD PRESSURE: 134 MMHG | DIASTOLIC BLOOD PRESSURE: 69 MMHG | WEIGHT: 265.38 LBS | BODY MASS INDEX: 40.22 KG/M2

## 2022-11-30 DIAGNOSIS — O20.0 THREATENED MISCARRIAGE: Primary | ICD-10-CM

## 2022-11-30 DIAGNOSIS — Z72.0 TOBACCO USE: ICD-10-CM

## 2022-11-30 DIAGNOSIS — O20.0 THREATENED MISCARRIAGE: ICD-10-CM

## 2022-11-30 PROBLEM — R53.1 WEAKNESS: Status: RESOLVED | Noted: 2020-10-09 | Resolved: 2022-11-30

## 2022-11-30 PROBLEM — M25.674: Status: RESOLVED | Noted: 2020-10-09 | Resolved: 2022-11-30

## 2022-11-30 LAB — HCG INTACT+B SERPL-ACNC: NORMAL MIU/ML

## 2022-11-30 PROCEDURE — 99203 PR OFFICE/OUTPT VISIT, NEW, LEVL III, 30-44 MIN: ICD-10-PCS | Mod: TH,S$GLB,, | Performed by: ADVANCED PRACTICE MIDWIFE

## 2022-11-30 PROCEDURE — 3008F PR BODY MASS INDEX (BMI) DOCUMENTED: ICD-10-PCS | Mod: CPTII,S$GLB,, | Performed by: ADVANCED PRACTICE MIDWIFE

## 2022-11-30 PROCEDURE — 3078F PR MOST RECENT DIASTOLIC BLOOD PRESSURE < 80 MM HG: ICD-10-PCS | Mod: CPTII,S$GLB,, | Performed by: ADVANCED PRACTICE MIDWIFE

## 2022-11-30 PROCEDURE — 1159F MED LIST DOCD IN RCRD: CPT | Mod: CPTII,S$GLB,, | Performed by: ADVANCED PRACTICE MIDWIFE

## 2022-11-30 PROCEDURE — 36415 COLL VENOUS BLD VENIPUNCTURE: CPT | Mod: ,,, | Performed by: STUDENT IN AN ORGANIZED HEALTH CARE EDUCATION/TRAINING PROGRAM

## 2022-11-30 PROCEDURE — 1159F PR MEDICATION LIST DOCUMENTED IN MEDICAL RECORD: ICD-10-PCS | Mod: CPTII,S$GLB,, | Performed by: ADVANCED PRACTICE MIDWIFE

## 2022-11-30 PROCEDURE — 84702 CHORIONIC GONADOTROPIN TEST: CPT | Performed by: ADVANCED PRACTICE MIDWIFE

## 2022-11-30 PROCEDURE — 3008F BODY MASS INDEX DOCD: CPT | Mod: CPTII,S$GLB,, | Performed by: ADVANCED PRACTICE MIDWIFE

## 2022-11-30 PROCEDURE — 3075F SYST BP GE 130 - 139MM HG: CPT | Mod: CPTII,S$GLB,, | Performed by: ADVANCED PRACTICE MIDWIFE

## 2022-11-30 PROCEDURE — 99203 OFFICE O/P NEW LOW 30 MIN: CPT | Mod: TH,S$GLB,, | Performed by: ADVANCED PRACTICE MIDWIFE

## 2022-11-30 PROCEDURE — 3078F DIAST BP <80 MM HG: CPT | Mod: CPTII,S$GLB,, | Performed by: ADVANCED PRACTICE MIDWIFE

## 2022-11-30 PROCEDURE — 36415 PR COLLECTION VENOUS BLOOD,VENIPUNCTURE: ICD-10-PCS | Mod: ,,, | Performed by: STUDENT IN AN ORGANIZED HEALTH CARE EDUCATION/TRAINING PROGRAM

## 2022-11-30 PROCEDURE — 3075F PR MOST RECENT SYSTOLIC BLOOD PRESS GE 130-139MM HG: ICD-10-PCS | Mod: CPTII,S$GLB,, | Performed by: ADVANCED PRACTICE MIDWIFE

## 2022-11-30 NOTE — LETTER
November 30, 2022      [facility] - Obstetrics And Gynecology  [address] [place] [address]  Phone: [phone]  Fax: [phone]       Patient: Mayda Buck   YOB: 1995  Date of Visit: 11/30/2022    To Whom It May Concern:    Abdoulaye Buck  was at [facility] on 11/28/22. The patient may return to work/school on 12/1/22 with no restrictions. If you have any questions or concerns, or if I can be of further assistance, please do not hesitate to contact me.    Sincerely,    Kalyani Tamayo LPN

## 2022-11-30 NOTE — PROGRESS NOTES
CC: Absence of menses    Mayda Buck is a 27 y.o. female  seen at  on 22 for pelvic pain. Has labs/US results on phone/portal. US on 22 did not reveal gestational sac. Quant HCG = 1,256. A 9.1 cm complex cyst was present to right ovary as well as a 3.2 cm cyst to left ovary. LMP, sure 22 = EDC of 23. Per LMP, EGA 11+ 2/7 weeks which is not c/w  US. Told to f/u with OB/GYN. Here for #1 visit with our practice.   UPT is positive. No lof/brvb, dysuria, fever/chills or abdominal pain/pelvic pain. Mild N&V.     History  OBGYN HX:    9.1 cm right complex ovarian cyst, 3.2 cm left ovarian cyst  RH positive  Tobacco use  PMHX:  Fx of ankle  ALLERGY:  NKDA  Cat/Dog allergy  SURGHX:  Open reduction and fixation of ankle 2020  SOCHX:  Smoker  Denies etoh or substance use in pregnancy.    History reviewed. No pertinent past medical history.  Past Surgical History:   Procedure Laterality Date    OPEN REDUCTION AND INTERNAL FIXATION (ORIF) OF FRACTURE OF LATERAL MALLEOLUS Right 2020    Procedure: ORIF, FRACTURE, FIBULA, LATERAL MALLEOLUS;  Surgeon: Nico Rasmussen DO;  Location: [facility] OR;  Service: Orthopedics;  Laterality: Right;  Equipment: [place] [employer] Precontoured locking lateral malleous fracture set; 4-1 Cannulated HEADED screw set; [place] [employer] Pilon Plating System  Vendor/Rep: [place] [employer]  C-Arm: Entire  REQUIRES FIRST ASSISTANT STEPHEN    ORIF TIBIA FRACTURE Right 2020    Procedure: ORIF, FRACTURE, TIBIA;  Surgeon: Nico Rasmussen DO;  Location: [facility] OR;  Service: Orthopedics;  Laterality: Right;  Equipment: [place] [employer] Precontoured locking lateral malleous fracture set; 4-1 Cannulated HEADED screw set; [place] [employer] Pilon Plating System  Vendor/Rep: [place] [employer]  C-Arm: Entire  REQUIRES FIRST ASSISTANT STEPHEN     Social History     Socioeconomic History    Marital status: Single   Tobacco Use    Smoking status: Never    Smokeless tobacco: Never   Substance and Sexual  "Activity    Alcohol use: Not Currently    Drug use: Not Currently    Sexual activity: Yes     Family History   Problem Relation Age of Onset    Hypertension Maternal Grandmother     Stroke Maternal Grandmother     Stroke Mother      OB History   No obstetric history on file.       /69   Ht 5' 8" (1.727 m)   Wt 120.4 kg (265 lb 6.4 oz)   BMI 40.35 kg/m²     ROS:  GENERAL: Denies weight gain or weight loss. Feeling well overall.   SKIN: Denies rash or lesions.   HEAD: Denies head injury or headache.   NODES: Denies enlarged lymph nodes.   CHEST: Denies chest pain or shortness of breath.   CARDIOVASCULAR: Denies palpitations or left sided chest pain.   ABDOMEN: No abdominal pain, constipation, or rectal bleeding. Mild to Mod N&V.  URINARY: No frequency, dysuria, hematuria, or burning on urination.  REPRODUCTIVE: See HPI.   BREASTS: The patient performs breast self-examination and denies pain, lumps, or nipple discharge.   HEMATOLOGIC: No easy bruisability or excessive bleeding.   MUSCULOSKELETAL: Denies joint pain or swelling.   NEUROLOGIC: Denies syncope or weakness.   PSYCHIATRIC: Denies depression, anxiety or mood swings.    PE:   APPEARANCE: Well nourished, well groomed, well developed, in no acute distress.  AFFECT: WNL, alert and oriented x 3.  SKIN: No acne or hirsutism. No lesion/rashes.  NEURO: Gait is smooth and steady.  EXTREMITIES: No edema.      ASSESSMENT and PLAN:    ICD-10-CM ICD-9-CM    1. Threatened miscarriage  O20.0 640.00 hCG, quantitative      US OB/GYN Procedure ([facility])-Future      CANCELED: Type & Screen      2. Tobacco use  Z72.0 305.1           Gorin to our collaborative OB/GYN practice and CNM care.  S&S of SAB reinforced.  Daily PNV recommended.  Patient was counseled today on proper weight gain.  Discussed foods to avoid in pregnancy (i.e. sushi, fish that are high in mercury, deli meat, and unpasteurized cheeses).   Discussed hcg quant and US results from  on 11/22. Suspect " early gestation but miscarriage vs ectopic cannot be excluded. S&S of SAB and ectopic reviewed/stressed.  Quant HCG today. US ordered for next week. Aware that we may need to alter US date based upon HCG quant. [facility] portal planned for communication regarding labs/US and follow up care/NIKHIL.  Tobacco cessation is highly recommended.

## 2022-12-06 RX ORDER — PROMETHAZINE HYDROCHLORIDE 25 MG/1
25 SUPPOSITORY RECTAL EVERY 8 HOURS
Qty: 12 SUPPOSITORY | Refills: 1 | Status: SHIPPED | OUTPATIENT
Start: 2022-12-06 | End: 2023-06-16

## 2022-12-06 RX ORDER — ONDANSETRON 4 MG/1
4 TABLET, FILM COATED ORAL EVERY 6 HOURS PRN
Qty: 30 TABLET | Refills: 1 | Status: SHIPPED | OUTPATIENT
Start: 2022-12-06 | End: 2023-06-16

## 2022-12-06 RX ORDER — PROMETHAZINE HYDROCHLORIDE 25 MG/1
25 TABLET ORAL EVERY 8 HOURS
Qty: 30 TABLET | Refills: 1 | Status: SHIPPED | OUTPATIENT
Start: 2022-12-06 | End: 2023-06-16

## 2022-12-08 ENCOUNTER — PROCEDURE VISIT (OUTPATIENT)
Dept: MATERNAL FETAL MEDICINE | Facility: CLINIC | Age: 27
End: 2022-12-08
Payer: MEDICAID

## 2022-12-08 ENCOUNTER — PATIENT MESSAGE (OUTPATIENT)
Dept: OBSTETRICS AND GYNECOLOGY | Facility: CLINIC | Age: 27
End: 2022-12-08
Payer: MEDICAID

## 2022-12-08 DIAGNOSIS — Z3A.01 6 WEEKS GESTATION OF PREGNANCY: Primary | ICD-10-CM

## 2022-12-08 DIAGNOSIS — O20.0 THREATENED MISCARRIAGE: ICD-10-CM

## 2022-12-08 PROCEDURE — 76801 OB US < 14 WKS SINGLE FETUS: CPT | Mod: S$GLB,,, | Performed by: OBSTETRICS & GYNECOLOGY

## 2022-12-08 PROCEDURE — 76801 US OB/GYN PROCEDURE (VIEWPOINT): ICD-10-PCS | Mod: S$GLB,,, | Performed by: OBSTETRICS & GYNECOLOGY

## 2022-12-09 ENCOUNTER — TELEPHONE (OUTPATIENT)
Dept: OBSTETRICS AND GYNECOLOGY | Facility: CLINIC | Age: 27
End: 2022-12-09
Payer: MEDICAID

## 2022-12-09 DIAGNOSIS — Z36.89 ENCOUNTER FOR FETAL ANATOMIC SURVEY: Primary | ICD-10-CM

## 2022-12-09 NOTE — TELEPHONE ENCOUNTER
Attempted to reach pt. No answer, LVM to call or message back.       ----- Message from Horace Lopez sent at 12/9/2022  2:22 PM CST -----  Pt would like a call back has questions about the medications she is taking.

## 2022-12-12 ENCOUNTER — TELEPHONE (OUTPATIENT)
Dept: OBSTETRICS AND GYNECOLOGY | Facility: CLINIC | Age: 27
End: 2022-12-12
Payer: MEDICAID

## 2022-12-14 ENCOUNTER — TELEPHONE (OUTPATIENT)
Dept: OBSTETRICS AND GYNECOLOGY | Facility: CLINIC | Age: 27
End: 2022-12-14

## 2022-12-14 DIAGNOSIS — M79.18 MUSCULOSKELETAL PAIN: Primary | ICD-10-CM

## 2022-12-14 RX ORDER — CYCLOBENZAPRINE HCL 5 MG
5 TABLET ORAL 3 TIMES DAILY PRN
Qty: 20 TABLET | Refills: 0 | Status: SHIPPED | OUTPATIENT
Start: 2022-12-14 | End: 2023-03-01
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
## 2022-12-29 ENCOUNTER — ROUTINE PRENATAL (OUTPATIENT)
Dept: OBSTETRICS AND GYNECOLOGY | Facility: CLINIC | Age: 27
End: 2022-12-29
Payer: MEDICAID

## 2022-12-29 VITALS
WEIGHT: 259 LBS | BODY MASS INDEX: 39.38 KG/M2 | SYSTOLIC BLOOD PRESSURE: 118 MMHG | DIASTOLIC BLOOD PRESSURE: 69 MMHG | HEART RATE: 85 BPM

## 2022-12-29 DIAGNOSIS — O99.210 OBESITY AFFECTING PREGNANCY, ANTEPARTUM: ICD-10-CM

## 2022-12-29 DIAGNOSIS — M79.18 MUSCULOSKELETAL PAIN: ICD-10-CM

## 2022-12-29 DIAGNOSIS — Z72.0 TOBACCO USE: Primary | ICD-10-CM

## 2022-12-29 DIAGNOSIS — Z3A.09 9 WEEKS GESTATION OF PREGNANCY: ICD-10-CM

## 2022-12-29 PROBLEM — O20.0 THREATENED MISCARRIAGE: Status: RESOLVED | Noted: 2022-11-30 | Resolved: 2022-12-29

## 2022-12-29 PROBLEM — Z3A.01 6 WEEKS GESTATION OF PREGNANCY: Status: RESOLVED | Noted: 2022-12-08 | Resolved: 2022-12-29

## 2022-12-29 PROCEDURE — 99214 PR OFFICE/OUTPT VISIT, EST, LEVL IV, 30-39 MIN: ICD-10-PCS | Mod: TH,S$GLB,, | Performed by: ADVANCED PRACTICE MIDWIFE

## 2022-12-29 PROCEDURE — 99214 OFFICE O/P EST MOD 30 MIN: CPT | Mod: TH,S$GLB,, | Performed by: ADVANCED PRACTICE MIDWIFE

## 2022-12-29 NOTE — PROGRESS NOTES
Mayda Buck is a 27 y.o. female  at 9 + 4/7 weeks per US at 6 + 4/7 weeks in our [facility] office. EDC = 23.  Doing well. Seen  at ER for musculoskeletal strain. Given RX for Lortab and Flexeril. Took 2 Lortab and PRN Flexeril. Symptoms have mostly resolved. No LOF/BRVB. Dysuria, fever/chills or abdominal pain. Mild N&V tx with Zofran/Phenergan.    History  OBGYN HX:    Ovarian cyst  9.1 cm right complex ovarian cyst, 3.2 cm left ovarian cyst. Repeat US at 6 + 4/7 weeks = maternal right sided unilocular cyst measures 5.7 cm in mean diameter and demonstrates a single internal solid component that measures 10 mm in greatest diameter without papillary structures, internal vascularity or ascites. Findings are highly suggestive of benign pathology in a low risk patient. Recommend re-assessment in 6-8 weeks (with anatomy US).    RH positive  Tobacco use  PMHX:  Fx of ankle  ALLERGY:  NKDA  Cat/Dog allergy  SURGHX:  Open reduction and fixation of ankle 2020  SOCHX:  Smoker  Denies etoh or substance use in pregnancy.       Outpatient Medications Prior to Visit   Medication Sig Dispense Refill    cyclobenzaprine (FLEXERIL) 5 MG tablet Take 1 tablet (5 mg total) by mouth 3 (three) times daily as needed for Muscle spasms. 20 tablet 0    ondansetron (ZOFRAN) 4 MG tablet Take 1 tablet (4 mg total) by mouth every 6 (six) hours as needed for Nausea. 30 tablet 1    promethazine (PHENERGAN) 25 MG suppository Place 1 suppository (25 mg total) rectally every 8 (eight) hours. 12 suppository 1    promethazine (PHENERGAN) 25 MG tablet Take 1 tablet (25 mg total) by mouth every 8 (eight) hours. 30 tablet 1     No facility-administered medications prior to visit.       Review of patient's allergies indicates:   Allergen Reactions    Cat's claw      Other reaction(s): Difficulty breathing, Hives, Sneezing    Cat dander      Other reaction(s): Other (See Comments)  Sneezing and eyes watering       History  reviewed. No pertinent past medical history.    Past Surgical History:   Procedure Laterality Date    OPEN REDUCTION AND INTERNAL FIXATION (ORIF) OF FRACTURE OF LATERAL MALLEOLUS Right 2020    Procedure: ORIF, FRACTURE, FIBULA, LATERAL MALLEOLUS;  Surgeon: Nico Rasmussen DO;  Location: [facility] OR;  Service: Orthopedics;  Laterality: Right;  Equipment: [place] 28 Precontoured locking lateral malleous fracture set; 4-1 Cannulated HEADED screw set; [employer] Pilon Plating System  Vendor/Rep: [employer]  C-Arm: Entire  REQUIRES FIRST ASSISTANT STEPHEN    ORIF TIBIA FRACTURE Right 2020    Procedure: ORIF, FRACTURE, TIBIA;  Surgeon: Nico Rasmussen DO;  Location: [facility] OR;  Service: Orthopedics;  Laterality: Right;  Equipment: [place] 28 Precontoured locking lateral malleous fracture set; 4-1 Cannulated HEADED screw set; [employer] Pilon Plating System  Vendor/Rep: [employer]  C-Arm: Entire  REQUIRES FIRST ASSISTANT STEPHEN       OB History    Para Term  AB Living   1             SAB IAB Ectopic Multiple Live Births                  # Outcome Date GA Lbr Nicholas/2nd Weight Sex Delivery Anes PTL Lv   1 Current                Family History   Problem Relation Age of Onset    Hypertension Maternal Grandmother     Stroke Maternal Grandmother     Stroke Mother        Social History     Socioeconomic History    Marital status: Single   Tobacco Use    Smoking status: Never    Smokeless tobacco: Never   Substance and Sexual Activity    Alcohol use: Not Currently    Drug use: Not Currently    Sexual activity: Yes       Vitals:    22 0930   BP: 118/69   Pulse: 85       ROS:  GENERAL: Denies weight gain or weight loss. Feeling well overall.   SKIN: Denies rash or lesions.   HEAD: Denies head injury or headache.   NODES: Denies enlarged lymph nodes.   CHEST: Denies chest pain or shortness of breath.   CARDIOVASCULAR: Denies palpitations or left sided chest pain.   ABDOMEN: No abdominal pain,  constipation, diarrhea, or rectal bleeding. Mild N&V.  URINARY: No frequency, dysuria, hematuria, or burning on urination.  REPRODUCTIVE: See HPI.   BREASTS: The patient performs breast self-examination and denies pain, lumps, or nipple discharge.   HEMATOLOGIC: No easy bruisability or excessive bleeding.   MUSCULOSKELETAL: Denies joint pain or swelling.   NEUROLOGIC: Denies syncope or weakness.   PSYCHIATRIC: Denies depression, anxiety or mood swings.    Physical Exam:   APPEARANCE: Well nourished, well developed, in no acute distress.  SKIN: Normal skin turgor, no lesions.  ABDOMEN: Soft. No tenderness or masses. No hepatosplenomegaly. No hernias. No auscultation of fhts today, early gestation.  EXTREMITIES: NO clubbing cyanosis or edema      ASSESSMENT  Encounter Diagnoses   Name Primary?    Tobacco use Yes    Obesity affecting pregnancy, antepartum     9 weeks gestation of pregnancy     Musculoskeletal pain        PLAN  1. S&S of SAB reinforced.  2. Continue daily pnv and prn Flexeril, phenergan and Zofran.   3. Healthy diet/nutrition and recommendations for weight gain in pregnancy discussed. 15-20 lbs recommended.  4. Normal discomforts in pregnancy discussed.  5. Dating US and EDC discussed.  6. Decreasing size of ovarian cyst/normal discomforts discussed.  7. Plan anatomy US for 20 weeks with eval of cyst. Will order at next visit.  8. Tobacco cessation discussed previously.  9. Growth at 32 and 3rd trimester AP testing due to BMI discussed.  10. All new ob labs today with Panorama.  11. NIKHIL in 3 weeks.

## 2023-01-19 ENCOUNTER — ROUTINE PRENATAL (OUTPATIENT)
Dept: OBSTETRICS AND GYNECOLOGY | Facility: CLINIC | Age: 28
End: 2023-01-19
Payer: MEDICAID

## 2023-01-19 VITALS
HEART RATE: 85 BPM | WEIGHT: 262 LBS | BODY MASS INDEX: 39.84 KG/M2 | DIASTOLIC BLOOD PRESSURE: 67 MMHG | SYSTOLIC BLOOD PRESSURE: 122 MMHG

## 2023-01-19 DIAGNOSIS — Z86.19 HISTORY OF HERPES GENITALIS: ICD-10-CM

## 2023-01-19 DIAGNOSIS — Z72.0 TOBACCO USE: ICD-10-CM

## 2023-01-19 DIAGNOSIS — Z3A.12 12 WEEKS GESTATION OF PREGNANCY: Primary | ICD-10-CM

## 2023-01-19 DIAGNOSIS — O99.210 OBESITY AFFECTING PREGNANCY, ANTEPARTUM: ICD-10-CM

## 2023-01-19 PROBLEM — Z3A.09 9 WEEKS GESTATION OF PREGNANCY: Status: RESOLVED | Noted: 2022-12-29 | Resolved: 2023-01-19

## 2023-01-19 LAB
AMPHET+METHAMPHET UR QL: NEGATIVE
BARBITURATES UR QL SCN>200 NG/ML: NEGATIVE
BENZODIAZ UR QL SCN>200 NG/ML: NEGATIVE
BZE UR QL SCN: NEGATIVE
CANNABINOIDS UR QL SCN: ABNORMAL
CREAT UR-MCNC: 114.9 MG/DL (ref 15–325)
METHADONE UR QL SCN>300 NG/ML: NEGATIVE
OPIATES UR QL SCN: NEGATIVE
PCP UR QL SCN>25 NG/ML: NEGATIVE
TOXICOLOGY INFORMATION: ABNORMAL

## 2023-01-19 PROCEDURE — 85660 RBC SICKLE CELL TEST: CPT | Performed by: ADVANCED PRACTICE MIDWIFE

## 2023-01-19 PROCEDURE — 87591 N.GONORRHOEAE DNA AMP PROB: CPT | Performed by: ADVANCED PRACTICE MIDWIFE

## 2023-01-19 PROCEDURE — 87389 HIV-1 AG W/HIV-1&-2 AB AG IA: CPT | Performed by: ADVANCED PRACTICE MIDWIFE

## 2023-01-19 PROCEDURE — 99214 OFFICE O/P EST MOD 30 MIN: CPT | Mod: TH,S$GLB,, | Performed by: ADVANCED PRACTICE MIDWIFE

## 2023-01-19 PROCEDURE — 99214 PR OFFICE/OUTPT VISIT, EST, LEVL IV, 30-39 MIN: ICD-10-PCS | Mod: TH,S$GLB,, | Performed by: ADVANCED PRACTICE MIDWIFE

## 2023-01-19 PROCEDURE — 86592 SYPHILIS TEST NON-TREP QUAL: CPT | Performed by: ADVANCED PRACTICE MIDWIFE

## 2023-01-19 PROCEDURE — 80074 ACUTE HEPATITIS PANEL: CPT | Performed by: ADVANCED PRACTICE MIDWIFE

## 2023-01-19 PROCEDURE — 80307 DRUG TEST PRSMV CHEM ANLYZR: CPT | Performed by: ADVANCED PRACTICE MIDWIFE

## 2023-01-19 PROCEDURE — 86762 RUBELLA ANTIBODY: CPT | Performed by: ADVANCED PRACTICE MIDWIFE

## 2023-01-19 NOTE — PROGRESS NOTES
Mayda Buck is a 27 y.o. female  at 12 + 4/7 weeks per US at 6 + 4/7 weeks in our MFM office. EDC = 23.  Doing well. No LOF/BRVB. Dysuria, fever/chills or abdominal pain. Mild N&V tx with Zofran/Phenergan, improving. Informs me she has hx of HSV with last outbreak a few months ago. Has RX for Valtrex. Recommend daily suppression/discussed.    History  OBGYN HX:    History HSV-taking Valtrex for suppression  Ovarian cyst  9.1 cm right complex ovarian cyst, 3.2 cm left ovarian cyst. Repeat US at 6 + 4/7 weeks = maternal right sided unilocular cyst measures 5.7 cm in mean diameter and demonstrates a single internal solid component that measures 10 mm in greatest diameter without papillary structures, internal vascularity or ascites. Findings are highly suggestive of benign pathology in a low risk patient. Recommend re-assessment in 6-8 weeks (with anatomy US).    RH positive  Tobacco use  PMHX:  Fx of ankle  ALLERGY:  NKDA  Cat/Dog allergy  SURGHX:  Open reduction and fixation of ankle 2020  SOCHX:  Smoker  Denies etoh or substance use in pregnancy.       Outpatient Medications Prior to Visit   Medication Sig Dispense Refill    cyclobenzaprine (FLEXERIL) 5 MG tablet Take 1 tablet (5 mg total) by mouth 3 (three) times daily as needed for Muscle spasms. (Patient not taking: Reported on 2023) 20 tablet 0    ondansetron (ZOFRAN) 4 MG tablet Take 1 tablet (4 mg total) by mouth every 6 (six) hours as needed for Nausea. (Patient not taking: Reported on 2023) 30 tablet 1    promethazine (PHENERGAN) 25 MG suppository Place 1 suppository (25 mg total) rectally every 8 (eight) hours. (Patient not taking: Reported on 2023) 12 suppository 1    promethazine (PHENERGAN) 25 MG tablet Take 1 tablet (25 mg total) by mouth every 8 (eight) hours. (Patient not taking: Reported on 2023) 30 tablet 1     No facility-administered medications prior to visit.       Review of patient's allergies  indicates:   Allergen Reactions    Animal dander Hives and Other (See Comments)     Other reaction(s): Difficulty breathing    Cat's claw      Other reaction(s): Difficulty breathing, Hives, Sneezing    Cat dander      Other reaction(s): Other (See Comments)  Sneezing and eyes watering       History reviewed. No pertinent past medical history.    Past Surgical History:   Procedure Laterality Date    OPEN REDUCTION AND INTERNAL FIXATION (ORIF) OF FRACTURE OF LATERAL MALLEOLUS Right 2020    Procedure: ORIF, FRACTURE, FIBULA, LATERAL MALLEOLUS;  Surgeon: Nico Rasmussen DO;  Location: [facility] OR;  Service: Orthopedics;  Laterality: Right;  Equipment: [place] 28 Precontoured locking lateral malleous fracture set; 4-1 Cannulated HEADED screw set; [employer] Pilon Plating System  Vendor/Rep: [employer]  C-Arm: Entire  REQUIRES FIRST ASSISTANT STEPHEN    ORIF TIBIA FRACTURE Right 2020    Procedure: ORIF, FRACTURE, TIBIA;  Surgeon: Nico Rasmussen DO;  Location: [facility] OR;  Service: Orthopedics;  Laterality: Right;  Equipment: [employer] 28 Precontoured locking lateral malleous fracture set; 4-1 Cannulated HEADED screw set; [employer] Pilon Plating System  Vendor/Rep: [employer]  C-Arm: Entire  REQUIRES FIRST ASSISTANT STEPHEN       OB History    Para Term  AB Living   1             SAB IAB Ectopic Multiple Live Births                  # Outcome Date GA Lbr Nicholas/2nd Weight Sex Delivery Anes PTL Lv   1 Current                Family History   Problem Relation Age of Onset    Hypertension Maternal Grandmother     Stroke Maternal Grandmother     Stroke Mother        Social History     Socioeconomic History    Marital status: Single   Tobacco Use    Smoking status: Never     Passive exposure: Never    Smokeless tobacco: Never   Substance and Sexual Activity    Alcohol use: Not Currently    Drug use: Not Currently    Sexual activity: Yes       Vitals:    23 1012   BP: 122/67   Pulse: 85        ROS:  GENERAL: Denies weight gain or weight loss. Feeling well overall.   SKIN: Denies rash or lesions.   HEAD: Denies head injury or headache.   NODES: Denies enlarged lymph nodes.   CHEST: Denies chest pain or shortness of breath.   CARDIOVASCULAR: Denies palpitations or left sided chest pain.   ABDOMEN: No abdominal pain, constipation, diarrhea, or rectal bleeding. Mild N&V.  URINARY: No frequency, dysuria, hematuria, or burning on urination.  REPRODUCTIVE: See HPI.   BREASTS: The patient performs breast self-examination and denies pain, lumps, or nipple discharge.   HEMATOLOGIC: No easy bruisability or excessive bleeding.   MUSCULOSKELETAL: Denies joint pain or swelling.   NEUROLOGIC: Denies syncope or weakness.   PSYCHIATRIC: Denies depression, anxiety or mood swings.    Physical Exam:   APPEARANCE: Well nourished, well developed, in no acute distress.  SKIN: Normal skin turgor, no lesions.  ABDOMEN: Soft. No tenderness or masses. No hepatosplenomegaly. No hernias. No auscultation of fhts today, early gestation.  EXTREMITIES: NO clubbing cyanosis or edema      ASSESSMENT  Encounter Diagnoses   Name Primary?    12 weeks gestation of pregnancy Yes    Obesity affecting pregnancy, antepartum     Tobacco use     History of herpes genitalis        PLAN  1. S&S of SAB reinforced.  2. Continue daily pnv and prn Flexeril (musculoskeletal discomfort), phenergan and Zofran. Daily Valtrex for HSV suppression.  3.  Tobacco cessation discussed previously.  4. Anatomy US for 20 weeks planned with eval of cyst. Will order at next visit.  5.  Growth at 32 and 3rd trimester AP testing due to BMI discussed.  6.  All new ob labs today with Panorama. Plans gender reveal.  7.  NIKHIL 3 weeks with AFP.

## 2023-01-25 LAB
C TRACH DNA SPEC QL NAA+PROBE: NOT DETECTED
N GONORRHOEA DNA SPEC QL NAA+PROBE: NOT DETECTED

## 2023-02-06 ENCOUNTER — PATIENT MESSAGE (OUTPATIENT)
Dept: OBSTETRICS AND GYNECOLOGY | Facility: CLINIC | Age: 28
End: 2023-02-06

## 2023-02-06 ENCOUNTER — ROUTINE PRENATAL (OUTPATIENT)
Dept: OBSTETRICS AND GYNECOLOGY | Facility: CLINIC | Age: 28
End: 2023-02-06
Payer: MEDICAID

## 2023-02-06 VITALS
HEART RATE: 85 BPM | BODY MASS INDEX: 40.6 KG/M2 | DIASTOLIC BLOOD PRESSURE: 65 MMHG | SYSTOLIC BLOOD PRESSURE: 124 MMHG | WEIGHT: 267 LBS

## 2023-02-06 DIAGNOSIS — Z86.19 HISTORY OF HERPES GENITALIS: ICD-10-CM

## 2023-02-06 DIAGNOSIS — Z3A.15 15 WEEKS GESTATION OF PREGNANCY: Primary | ICD-10-CM

## 2023-02-06 DIAGNOSIS — O99.210 OBESITY AFFECTING PREGNANCY, ANTEPARTUM: ICD-10-CM

## 2023-02-06 DIAGNOSIS — Z72.0 TOBACCO USE: ICD-10-CM

## 2023-02-06 PROCEDURE — 87086 URINE CULTURE/COLONY COUNT: CPT | Performed by: ADVANCED PRACTICE MIDWIFE

## 2023-02-06 PROCEDURE — 59425 PR ANTEPARTUM CARE ONLY, 4-6 VISITS: ICD-10-PCS | Mod: TH,S$GLB,, | Performed by: ADVANCED PRACTICE MIDWIFE

## 2023-02-06 PROCEDURE — 59425 ANTEPARTUM CARE ONLY: CPT | Mod: TH,S$GLB,, | Performed by: ADVANCED PRACTICE MIDWIFE

## 2023-02-06 NOTE — PROGRESS NOTES
Mayda Buck is a 27 y.o. female  at 15 + 1/7 weeks per US at 6 + 4/7 weeks in our [facility] office. EDC = 23.  Doing well. No LOF/BRVB. Dysuria, fever/chills or abdominal pain. Mild N&V tx with Zofran/Phenergan, improving. History of HSV with last outbreak a few months ago. Taking Valtrex for suppression.    History  OBGYN HX:    History HSV-taking Valtrex for suppression  Ovarian cyst  9.1 cm right complex ovarian cyst, 3.2 cm left ovarian cyst. Repeat US at 6 + 4/7 weeks = maternal right sided unilocular cyst measures 5.7 cm in mean diameter and demonstrates a single internal solid component that measures 10 mm in greatest diameter without papillary structures, internal vascularity or ascites. Findings are highly suggestive of benign pathology in a low risk patient. Recommend re-assessment in 6-8 weeks (with anatomy US).  RH positive  + UDS for THC  Tobacco use  PMHX:  Fx of ankle  ALLERGY:  NKDA  Cat/Dog allergy  SURGHX:  Open reduction and fixation of ankle 2020  SOCHX:  Smoker  Denies etoh or substance use in pregnancy.    LABS:  B pos, abs neg  HIV neg  Rubella immune  RPR non reactive  Sickle Screen neg  GC CHL neg neg  Urine cx pending  AFP pending  CBC    + UDS THC  HEP A B C neg       Outpatient Medications Prior to Visit   Medication Sig Dispense Refill    cyclobenzaprine (FLEXERIL) 5 MG tablet Take 1 tablet (5 mg total) by mouth 3 (three) times daily as needed for Muscle spasms. 20 tablet 0    ondansetron (ZOFRAN) 4 MG tablet Take 1 tablet (4 mg total) by mouth every 6 (six) hours as needed for Nausea. (Patient not taking: Reported on 2023) 30 tablet 1    promethazine (PHENERGAN) 25 MG suppository Place 1 suppository (25 mg total) rectally every 8 (eight) hours. (Patient not taking: Reported on 2023) 12 suppository 1    promethazine (PHENERGAN) 25 MG tablet Take 1 tablet (25 mg total) by mouth every 8 (eight) hours. (Patient not taking: Reported on  2023) 30 tablet 1     No facility-administered medications prior to visit.       Review of patient's allergies indicates:   Allergen Reactions    Animal dander Hives and Other (See Comments)     Other reaction(s): Difficulty breathing    Cat's claw      Other reaction(s): Difficulty breathing, Hives, Sneezing    Cat dander      Other reaction(s): Other (See Comments)  Sneezing and eyes watering       History reviewed. No pertinent past medical history.    Past Surgical History:   Procedure Laterality Date    OPEN REDUCTION AND INTERNAL FIXATION (ORIF) OF FRACTURE OF LATERAL MALLEOLUS Right 2020    Procedure: ORIF, FRACTURE, FIBULA, LATERAL MALLEOLUS;  Surgeon: Nico Rasmussen DO;  Location: [facility] OR;  Service: Orthopedics;  Laterality: Right;  Equipment: [employer] 28 Precontoured locking lateral malleous fracture set; 4-1 Cannulated HEADED screw set; [employer] Pilon Plating System  Vendor/Rep: [employer]  C-Arm: Entire  REQUIRES FIRST ASSISTANT STEPHEN    ORIF TIBIA FRACTURE Right 2020    Procedure: ORIF, FRACTURE, TIBIA;  Surgeon: Nico Rasmussen DO;  Location: [facility] OR;  Service: Orthopedics;  Laterality: Right;  Equipment: [employer] 28 Precontoured locking lateral malleous fracture set; 4-1 Cannulated HEADED screw set; [employer] Pilon Plating System  Vendor/Rep: [employer]  C-Arm: Entire  REQUIRES FIRST ASSISTANT STEPHEN       OB History    Para Term  AB Living   1             SAB IAB Ectopic Multiple Live Births                  # Outcome Date GA Lbr Nicholas/2nd Weight Sex Delivery Anes PTL Lv   1 Current                Family History   Problem Relation Age of Onset    Hypertension Maternal Grandmother     Stroke Maternal Grandmother     Stroke Mother        Social History     Socioeconomic History    Marital status: Single   Tobacco Use    Smoking status: Never     Passive exposure: Never    Smokeless tobacco: Never   Substance and Sexual Activity    Alcohol use: Not Currently     Drug use: Not Currently     Types: Marijuana    Sexual activity: Yes       Vitals:    02/06/23 1151   BP: 124/65   Pulse: 85       ROS:  GENERAL: Denies weight gain or weight loss. Feeling well overall.   SKIN: Denies rash or lesions.   HEAD: Denies head injury or headache.   NODES: Denies enlarged lymph nodes.   CHEST: Denies chest pain or shortness of breath.   CARDIOVASCULAR: Denies palpitations or left sided chest pain.   ABDOMEN: No abdominal pain, constipation, diarrhea, or rectal bleeding. Mild N&V.  URINARY: No frequency, dysuria, hematuria, or burning on urination.  REPRODUCTIVE: See HPI.   BREASTS: The patient performs breast self-examination and denies pain, lumps, or nipple discharge.   HEMATOLOGIC: No easy bruisability or excessive bleeding.   MUSCULOSKELETAL: Denies joint pain or swelling.   NEUROLOGIC: Denies syncope or weakness.   PSYCHIATRIC: Denies depression, anxiety or mood swings.    Fhts 150s    Physical Exam:   APPEARANCE: Well nourished, well developed, in no acute distress.  SKIN: Normal skin turgor, no lesions.  ABDOMEN: Soft. No tenderness or masses. No hepatosplenomegaly. No hernias. S=D  EXTREMITIES: NO clubbing cyanosis or edema      ASSESSMENT  Encounter Diagnoses   Name Primary?    15 weeks gestation of pregnancy Yes    Obesity affecting pregnancy, antepartum     Tobacco use     History of herpes genitalis        PLAN  1. S&S of SAB reinforced.  2. Continue daily pnv and prn Flexeril (musculoskeletal discomfort), phenergan and Zofran. Daily Valtrex for HSV suppression.  3.  Tobacco cessation discussed previously.  4. Anatomy US for 20 weeks planned with eval of cyst scheduled for 3/6/23.  5.  Growth at 32 and 3rd trimester AP testing due to BMI discussed.  6.  All new ob labs reviewed and discussed. + UDS. Has stopped using THC. Plan repeat UDS later in pregnancy. Urine cx today as not done previously in pregnancy.  7.  AFP today/discussed.  8. NIKHIL after anatomy US.

## 2023-02-08 LAB — BACTERIA UR CULT: NO GROWTH

## 2023-02-12 ENCOUNTER — PATIENT MESSAGE (OUTPATIENT)
Dept: OTHER | Facility: OTHER | Age: 28
End: 2023-02-12
Payer: MEDICAID

## 2023-02-19 ENCOUNTER — PATIENT MESSAGE (OUTPATIENT)
Dept: OTHER | Facility: OTHER | Age: 28
End: 2023-02-19
Payer: MEDICAID

## 2023-03-01 ENCOUNTER — ROUTINE PRENATAL (OUTPATIENT)
Dept: OBSTETRICS AND GYNECOLOGY | Facility: CLINIC | Age: 28
End: 2023-03-01
Payer: MEDICAID

## 2023-03-01 VITALS
WEIGHT: 267.88 LBS | SYSTOLIC BLOOD PRESSURE: 118 MMHG | BODY MASS INDEX: 40.73 KG/M2 | DIASTOLIC BLOOD PRESSURE: 57 MMHG

## 2023-03-01 DIAGNOSIS — Z72.0 TOBACCO USE: ICD-10-CM

## 2023-03-01 DIAGNOSIS — Z3A.18 18 WEEKS GESTATION OF PREGNANCY: Primary | ICD-10-CM

## 2023-03-01 DIAGNOSIS — M54.50 CHRONIC MIDLINE LOW BACK PAIN WITHOUT SCIATICA: ICD-10-CM

## 2023-03-01 DIAGNOSIS — M79.18 MUSCULOSKELETAL PAIN: ICD-10-CM

## 2023-03-01 DIAGNOSIS — G89.29 CHRONIC MIDLINE LOW BACK PAIN WITHOUT SCIATICA: ICD-10-CM

## 2023-03-01 DIAGNOSIS — Z86.19 HISTORY OF HERPES GENITALIS: ICD-10-CM

## 2023-03-01 DIAGNOSIS — O99.210 OBESITY AFFECTING PREGNANCY, ANTEPARTUM: ICD-10-CM

## 2023-03-01 PROBLEM — Z3A.15 15 WEEKS GESTATION OF PREGNANCY: Status: RESOLVED | Noted: 2023-02-06 | Resolved: 2023-03-01

## 2023-03-01 PROBLEM — Z3A.12 12 WEEKS GESTATION OF PREGNANCY: Status: RESOLVED | Noted: 2023-01-19 | Resolved: 2023-03-01

## 2023-03-01 LAB
BILIRUBIN, UA POC OHS: NEGATIVE
BLOOD, UA POC OHS: NEGATIVE
CLARITY, UA POC OHS: CLEAR
COLOR, UA POC OHS: YELLOW
GLUCOSE, UA POC OHS: NEGATIVE
KETONES, UA POC OHS: NEGATIVE
LEUKOCYTES, UA POC OHS: ABNORMAL
NITRITE, UA POC OHS: NEGATIVE
PH, UA POC OHS: 7
PROTEIN, UA POC OHS: ABNORMAL
SPECIFIC GRAVITY, UA POC OHS: 1.02
UROBILINOGEN, UA POC OHS: 1

## 2023-03-01 PROCEDURE — 59425 ANTEPARTUM CARE ONLY: CPT | Mod: TH,S$GLB,,

## 2023-03-01 PROCEDURE — 59425 PR ANTEPARTUM CARE ONLY, 4-6 VISITS: ICD-10-PCS | Mod: TH,S$GLB,,

## 2023-03-01 RX ORDER — CYCLOBENZAPRINE HCL 10 MG
10 TABLET ORAL 3 TIMES DAILY PRN
Qty: 30 TABLET | Refills: 2 | Status: SHIPPED | OUTPATIENT
Start: 2023-03-01 | End: 2023-03-31

## 2023-03-01 NOTE — PROGRESS NOTES
Mayda Buck is a 27 y.o. female  at 15 + 1/7 weeks per US at 6 + 4/7 weeks in our [facility] office. EDC = 23.      Presents for problem visit reporting pain in lower abdomen and back. Hx of back pain in early pregnancy but now worse. Nothing improving or worsening pain. Negative urine cx at 15 weeks. Pt denies symptoms of UTI. No cramping, contractions, LOF/BRVB. Denies dysuria, frequency fever/chills. Encouraged U/A to rule out UTI now.  Pt agreeable. U/A unremarkable. Also reviewed normal back pain and pregnancy with associated comfort measures including warm epsom salt baths, heating and ice packs, no lifting greater than 15 lb, pregnancy support belt, tylenol PRN. We also discuss increasing flexeril to 10 mg. Pt agreeable to plan of care. Pt aware if she experiences VB, LOF then she needs to go to ER.     History of HSV with last outbreak a few months ago. Taking Valtrex for suppression.    Anatomy scan scheduled for 2023.    History  OBGYN HX:    History HSV-taking Valtrex for suppression  Ovarian cyst  9.1 cm right complex ovarian cyst, 3.2 cm left ovarian cyst. Repeat US at 6 + 4/7 weeks = maternal right sided unilocular cyst measures 5.7 cm in mean diameter and demonstrates a single internal solid component that measures 10 mm in greatest diameter without papillary structures, internal vascularity or ascites. Findings are highly suggestive of benign pathology in a low risk patient. Recommend re-assessment in 6-8 weeks (with anatomy US).  RH positive  + UDS for THC  Tobacco use  PMHX:  Fx of ankle  ALLERGY:  NKDA  Cat/Dog allergy  SURGHX:  Open reduction and fixation of ankle 2020  SOCHX:  Smoker  Denies etoh or substance use in pregnancy.    LABS:  B pos, abs neg  HIV neg  Rubella immune  RPR non reactive  Sickle Screen neg  GC CHL neg neg  Urine cx No Growth  AFP Neg  NIPT Low Risk x 3  CBC    + UDS THC  HEP A B C neg       Outpatient Medications Prior to Visit    Medication Sig Dispense Refill    cyclobenzaprine (FLEXERIL) 5 MG tablet Take 1 tablet (5 mg total) by mouth 3 (three) times daily as needed for Muscle spasms. 20 tablet 0    ondansetron (ZOFRAN) 4 MG tablet Take 1 tablet (4 mg total) by mouth every 6 (six) hours as needed for Nausea. (Patient not taking: Reported on 1/19/2023) 30 tablet 1    promethazine (PHENERGAN) 25 MG suppository Place 1 suppository (25 mg total) rectally every 8 (eight) hours. (Patient not taking: Reported on 1/19/2023) 12 suppository 1    promethazine (PHENERGAN) 25 MG tablet Take 1 tablet (25 mg total) by mouth every 8 (eight) hours. (Patient not taking: Reported on 1/19/2023) 30 tablet 1     No facility-administered medications prior to visit.       Review of patient's allergies indicates:   Allergen Reactions    Animal dander Hives and Other (See Comments)     Other reaction(s): Difficulty breathing    Cat's claw      Other reaction(s): Difficulty breathing, Hives, Sneezing    Cat dander      Other reaction(s): Other (See Comments)  Sneezing and eyes watering       No past medical history on file.    Past Surgical History:   Procedure Laterality Date    OPEN REDUCTION AND INTERNAL FIXATION (ORIF) OF FRACTURE OF LATERAL MALLEOLUS Right 7/20/2020    Procedure: ORIF, FRACTURE, FIBULA, LATERAL MALLEOLUS;  Surgeon: Nico Rasmussen DO;  Location: [facility] OR;  Service: Orthopedics;  Laterality: Right;  Equipment: [place] 28 Precontoured locking lateral malleous fracture set; 4-1 Cannulated HEADED screw set; [place] [employer] Plating System  Vendor/Rep: [employer] [place] [employer]  C-Arm: Entire  REQUIRES FIRST ASSISTANT STEPHEN    ORIF TIBIA FRACTURE Right 7/20/2020    Procedure: ORIF, FRACTURE, TIBIA;  Surgeon: Nico Rasmussen DO;  Location: [facility] OR;  Service: Orthopedics;  Laterality: Right;  Equipment: [place] 28 Precontoured locking lateral malleous fracture set; 4-1 Cannulated HEADED screw set; [place] [employer] Pilon Plating System  Vendor/Rep: [employer]  28  C-Arm: Entire  REQUIRES FIRST ASSISTANT STEPHEN       OB History    Para Term  AB Living   1             SAB IAB Ectopic Multiple Live Births                  # Outcome Date GA Lbr Nicholas/2nd Weight Sex Delivery Anes PTL Lv   1 Current                Family History   Problem Relation Age of Onset    Hypertension Maternal Grandmother     Stroke Maternal Grandmother     Stroke Mother        Social History     Socioeconomic History    Marital status: Single   Tobacco Use    Smoking status: Never     Passive exposure: Never    Smokeless tobacco: Never   Substance and Sexual Activity    Alcohol use: Not Currently    Drug use: Not Currently     Types: Marijuana    Sexual activity: Yes       There were no vitals filed for this visit.    ROS:  GENERAL: Denies weight gain or weight loss. Feeling well overall.   SKIN: Denies rash or lesions.   HEAD: Denies head injury or headache.   NODES: Denies enlarged lymph nodes.   CHEST: Denies chest pain or shortness of breath.   CARDIOVASCULAR: Denies palpitations or left sided chest pain.   ABDOMEN: + lower abdominal pain, constipation, diarrhea, or rectal bleeding. Mild N&V.  URINARY: No frequency, dysuria, hematuria, or burning on urination.  REPRODUCTIVE: See HPI.   BREASTS: The patient performs breast self-examination and denies pain, lumps, or nipple discharge.   HEMATOLOGIC: No easy bruisability or excessive bleeding.   MUSCULOSKELETAL: Denies joint pain or swelling. + back pain  NEUROLOGIC: Denies syncope or weakness.   PSYCHIATRIC: Denies depression, anxiety or mood swings.    Fhts 150s    Physical Exam:   APPEARANCE: Well nourished, well developed, in no acute distress.  SKIN: Normal skin turgor, no lesions.  ABDOMEN: Soft. No tenderness or masses. No hepatosplenomegaly. No hernias. S=D  EXTREMITIES: NO clubbing cyanosis or edema      ASSESSMENT  No diagnosis found.    PLAN  1. S&S of SAB reinforced.  2. Continue daily pnv and prn Flexeril  (musculoskeletal discomfort), phenergan and Zofran. Daily Valtrex for HSV suppression.  3.  Tobacco cessation discussed previously.  4. Anatomy US for 20 weeks planned with eval of cyst scheduled for 3/6/23.  5.  Growth at 32 and 3rd trimester AP testing due to BMI discussed.  6.  All new ob labs reviewed and discussed. + UDS. Has stopped using THC. Plan repeat UDS later in pregnancy.   7.  NIPT Low Risk x 3. AFP today Negative.   8.  Anatomy u/s and ovarian cyst follow up on 03/06/2023.  9.  NIKHIL 4 weeks.

## 2023-03-06 ENCOUNTER — PROCEDURE VISIT (OUTPATIENT)
Dept: MATERNAL FETAL MEDICINE | Facility: CLINIC | Age: 28
End: 2023-03-06
Payer: MEDICAID

## 2023-03-06 DIAGNOSIS — Z36.89 ENCOUNTER FOR FETAL ANATOMIC SURVEY: ICD-10-CM

## 2023-03-06 PROCEDURE — 76811 US OB/GYN PROCEDURE (VIEWPOINT): ICD-10-PCS | Mod: S$GLB,,, | Performed by: OBSTETRICS & GYNECOLOGY

## 2023-03-06 PROCEDURE — 76811 OB US DETAILED SNGL FETUS: CPT | Mod: S$GLB,,, | Performed by: OBSTETRICS & GYNECOLOGY

## 2023-03-07 DIAGNOSIS — Z36.2 ENCOUNTER FOR FOLLOW-UP ULTRASOUND OF FETAL ANATOMY: Primary | ICD-10-CM

## 2023-03-12 ENCOUNTER — PATIENT MESSAGE (OUTPATIENT)
Dept: OTHER | Facility: OTHER | Age: 28
End: 2023-03-12
Payer: MEDICAID

## 2023-03-15 PROBLEM — Z3A.18 18 WEEKS GESTATION OF PREGNANCY: Status: RESOLVED | Noted: 2023-03-01 | Resolved: 2023-03-15

## 2023-03-15 PROBLEM — Z3A.20 20 WEEKS GESTATION OF PREGNANCY: Status: ACTIVE | Noted: 2023-03-15

## 2023-04-09 ENCOUNTER — PATIENT MESSAGE (OUTPATIENT)
Dept: OTHER | Facility: OTHER | Age: 28
End: 2023-04-09
Payer: MEDICAID

## 2023-04-14 ENCOUNTER — PROCEDURE VISIT (OUTPATIENT)
Dept: MATERNAL FETAL MEDICINE | Facility: CLINIC | Age: 28
End: 2023-04-14
Payer: MEDICAID

## 2023-04-14 ENCOUNTER — PATIENT MESSAGE (OUTPATIENT)
Dept: OBSTETRICS AND GYNECOLOGY | Facility: CLINIC | Age: 28
End: 2023-04-14
Payer: COMMERCIAL

## 2023-04-14 DIAGNOSIS — Z36.2 ENCOUNTER FOR FOLLOW-UP ULTRASOUND OF FETAL ANATOMY: ICD-10-CM

## 2023-04-14 PROCEDURE — 76816 US OB/GYN PROCEDURE (VIEWPOINT): ICD-10-PCS | Mod: S$GLB,,, | Performed by: OBSTETRICS & GYNECOLOGY

## 2023-04-14 PROCEDURE — 76816 OB US FOLLOW-UP PER FETUS: CPT | Mod: S$GLB,,, | Performed by: OBSTETRICS & GYNECOLOGY

## 2023-04-17 DIAGNOSIS — Z36.89 ENCOUNTER FOR ULTRASOUND TO ASSESS FETAL GROWTH: Primary | ICD-10-CM

## 2023-04-18 DIAGNOSIS — Z3A.25 25 WEEKS GESTATION OF PREGNANCY: Primary | ICD-10-CM

## 2023-04-23 ENCOUNTER — PATIENT MESSAGE (OUTPATIENT)
Dept: OTHER | Facility: OTHER | Age: 28
End: 2023-04-23
Payer: COMMERCIAL

## 2023-04-27 ENCOUNTER — LAB VISIT (OUTPATIENT)
Dept: LAB | Facility: CLINIC | Age: 28
End: 2023-04-27
Payer: MEDICAID

## 2023-04-27 ENCOUNTER — ROUTINE PRENATAL (OUTPATIENT)
Dept: OBSTETRICS AND GYNECOLOGY | Facility: CLINIC | Age: 28
End: 2023-04-27
Payer: MEDICAID

## 2023-04-27 VITALS
DIASTOLIC BLOOD PRESSURE: 59 MMHG | HEART RATE: 87 BPM | WEIGHT: 272.31 LBS | SYSTOLIC BLOOD PRESSURE: 111 MMHG | BODY MASS INDEX: 41.4 KG/M2

## 2023-04-27 DIAGNOSIS — Z72.0 TOBACCO USE: Primary | ICD-10-CM

## 2023-04-27 DIAGNOSIS — O99.320 MARIJUANA USE DURING PREGNANCY: ICD-10-CM

## 2023-04-27 DIAGNOSIS — Z3A.26 26 WEEKS GESTATION OF PREGNANCY: ICD-10-CM

## 2023-04-27 DIAGNOSIS — F12.90 MARIJUANA USE DURING PREGNANCY: ICD-10-CM

## 2023-04-27 DIAGNOSIS — Z3A.25 25 WEEKS GESTATION OF PREGNANCY: ICD-10-CM

## 2023-04-27 DIAGNOSIS — O99.210 OBESITY AFFECTING PREGNANCY, ANTEPARTUM: ICD-10-CM

## 2023-04-27 DIAGNOSIS — Z86.19 HISTORY OF HERPES GENITALIS: ICD-10-CM

## 2023-04-27 LAB — GLUCOSE SERPL-MCNC: 123 MG/DL (ref 70–140)

## 2023-04-27 PROCEDURE — 59425 PR ANTEPARTUM CARE ONLY, 4-6 VISITS: ICD-10-PCS | Mod: TH,S$GLB,, | Performed by: ADVANCED PRACTICE MIDWIFE

## 2023-04-27 PROCEDURE — 36415 PR COLLECTION VENOUS BLOOD,VENIPUNCTURE: ICD-10-PCS | Mod: ,,, | Performed by: STUDENT IN AN ORGANIZED HEALTH CARE EDUCATION/TRAINING PROGRAM

## 2023-04-27 PROCEDURE — 36415 COLL VENOUS BLD VENIPUNCTURE: CPT | Mod: ,,, | Performed by: STUDENT IN AN ORGANIZED HEALTH CARE EDUCATION/TRAINING PROGRAM

## 2023-04-27 PROCEDURE — 82950 GLUCOSE TEST: CPT | Performed by: ADVANCED PRACTICE MIDWIFE

## 2023-04-27 PROCEDURE — 59425 ANTEPARTUM CARE ONLY: CPT | Mod: TH,S$GLB,, | Performed by: ADVANCED PRACTICE MIDWIFE

## 2023-04-27 RX ORDER — VALACYCLOVIR HYDROCHLORIDE 500 MG/1
500 TABLET, FILM COATED ORAL
COMMUNITY
Start: 2023-04-03 | End: 2023-08-28 | Stop reason: SDUPTHER

## 2023-04-27 NOTE — PROGRESS NOTES
Mayda Buck is a 28 y.o. female  at 26 + 4/7 weeks per US at 6 + 4/7 weeks in our MFM office. EDC = 23.  Doing well. No LOF/BRVB. Dysuria, fever/chills or abdominal pain. Good FM. No S&S of pre eclampsia. History of HSV a few weeks ago. Taking Valtrex for suppression but is considering primary C/S as well as transfer of care to [place].     History  OBGYN HX:    History HSV-taking Valtrex for suppression  Ovarian cyst  9.1 cm right complex ovarian cyst, 3.2 cm left ovarian cyst. Repeat US at 6 + 4/7 weeks = maternal right sided unilocular cyst measures 5.7 cm in mean diameter and demonstrates a single internal solid component that measures 10 mm in greatest diameter without papillary structures, internal vascularity or ascites. Findings are highly suggestive of benign pathology in a low risk patient. Recommend re-assessment in 6-8 weeks (with anatomy US). No further mention of cyst on follow up US.     Morbid Obesity-Growth then weekly BPP at 32 weeks.  Gap in care from 18-26 weeks  RH positive  + UDS for THC  Tobacco use  PMHX:  Fx of ankle  ALLERGY:  NKDA  Cat/Dog allergy  SURGHX:  Open reduction and fixation of ankle 2020  SOCHX:  Smoker  Denies etoh or substance use in pregnancy.    LABS:  B pos, abs neg  HIV neg  Rubella immune  RPR non reactive  Sickle Screen neg  GC CHL neg neg  Urine cx wnl  AFP neg  CBC 13/38   + UDS THC  HEP A B C neg  1 hour gtt=  CBC=       Outpatient Medications Prior to Visit   Medication Sig Dispense Refill    valACYclovir (VALTREX) 500 MG tablet Take 500 mg by mouth.      ondansetron (ZOFRAN) 4 MG tablet Take 1 tablet (4 mg total) by mouth every 6 (six) hours as needed for Nausea. (Patient not taking: Reported on 2023) 30 tablet 1    promethazine (PHENERGAN) 25 MG suppository Place 1 suppository (25 mg total) rectally every 8 (eight) hours. (Patient not taking: Reported on 2023) 12 suppository 1    promethazine (PHENERGAN) 25 MG  tablet Take 1 tablet (25 mg total) by mouth every 8 (eight) hours. (Patient not taking: Reported on 2023) 30 tablet 1     No facility-administered medications prior to visit.       Review of patient's allergies indicates:   Allergen Reactions    Animal dander Hives and Other (See Comments)     Other reaction(s): Difficulty breathing    Cat's claw      Other reaction(s): Difficulty breathing, Hives, Sneezing    Cat dander      Other reaction(s): Other (See Comments)  Sneezing and eyes watering       History reviewed. No pertinent past medical history.    Past Surgical History:   Procedure Laterality Date    OPEN REDUCTION AND INTERNAL FIXATION (ORIF) OF FRACTURE OF LATERAL MALLEOLUS Right 2020    Procedure: ORIF, FRACTURE, FIBULA, LATERAL MALLEOLUS;  Surgeon: Nico Rasmussen DO;  Location: [facility] OR;  Service: Orthopedics;  Laterality: Right;  Equipment: [place] [employer] Precontoured locking lateral malleous fracture set; 4-1 Cannulated HEADED screw set; [place] [employer] Pilon Plating System  Vendor/Rep: [place] [employer]  C-Arm: Entire  REQUIRES FIRST ASSISTANT STEPHEN    ORIF TIBIA FRACTURE Right 2020    Procedure: ORIF, FRACTURE, TIBIA;  Surgeon: Nico Rasmussen DO;  Location: [facility] OR;  Service: Orthopedics;  Laterality: Right;  Equipment: [place] 28 Precontoured locking lateral malleous fracture set; 4-1 Cannulated HEADED screw set; [place] [employer] Pilon Plating System  Vendor/Rep: [place] [employer]  C-Arm: Entire  REQUIRES FIRST ASSISTANT STEPHEN       OB History    Para Term  AB Living   1             SAB IAB Ectopic Multiple Live Births                  # Outcome Date GA Lbr Nicholas/2nd Weight Sex Delivery Anes PTL Lv   1 Current                Family History   Problem Relation Age of Onset    Hypertension Maternal Grandmother     Stroke Maternal Grandmother     Stroke Mother        Social History     Socioeconomic History    Marital status: Single   Tobacco Use    Smoking status: Never     Passive exposure:  Never    Smokeless tobacco: Never   Substance and Sexual Activity    Alcohol use: Not Currently    Drug use: Not Currently     Types: Marijuana    Sexual activity: Yes     Partners: Male     Birth control/protection: None       Vitals:    04/27/23 0848   BP: (!) 111/59   Pulse: 87       ROS:  GENERAL: Denies weight gain or weight loss. Feeling well overall.   SKIN: Denies rash or lesions.   HEAD: Denies head injury or headache.   NODES: Denies enlarged lymph nodes.   CHEST: Denies chest pain or shortness of breath.   CARDIOVASCULAR: Denies palpitations or left sided chest pain.   ABDOMEN: No abdominal pain, constipation, diarrhea, or rectal bleeding. Mild N&V.  URINARY: No frequency, dysuria, hematuria, or burning on urination.  REPRODUCTIVE: See HPI.   BREASTS: The patient performs breast self-examination and denies pain, lumps, or nipple discharge.   HEMATOLOGIC: No easy bruisability or excessive bleeding.   MUSCULOSKELETAL: Denies joint pain or swelling.   NEUROLOGIC: Denies syncope or weakness.   PSYCHIATRIC: Denies depression, anxiety or mood swings.    Fhts 140s    Physical Exam:   APPEARANCE: Well nourished, well developed, in no acute distress.  SKIN: Normal skin turgor, no lesions.  ABDOMEN: Soft. No tenderness or masses. No hepatosplenomegaly. No hernias. S=D  EXTREMITIES: NO clubbing cyanosis or edema      ASSESSMENT  Encounter Diagnoses   Name Primary?    Tobacco use Yes    History of herpes genitalis     Obesity affecting pregnancy, antepartum     26 weeks gestation of pregnancy     Marijuana use during pregnancy        PLAN  1. S&S of PTL/PPROM and pre eclampsia discussed. FMC at 28 weeks discussed.  2. Continue daily pnv and Valtrex for HSV suppression.  3.  Tobacco cessation discussed previously.  4.  Incomplete visualization of some fetal anatomy on US. Re-eval at 32 weeks with growth.  5.  Weekly BPP at 32 weeks due to obesity.  6.  Importance of prenatal care stressed.  7.  Growth at 32  and 3rd trimester AP testing due to BMI discussed.  8.  HX + UDS. Has stopped using THC. Plan repeat UDS later in pregnancy.  9.  1 hour gtt/CBC today.  10.  NIKHIL 2 weeks.

## 2023-05-07 ENCOUNTER — PATIENT MESSAGE (OUTPATIENT)
Dept: OTHER | Facility: OTHER | Age: 28
End: 2023-05-07
Payer: COMMERCIAL

## 2023-05-11 ENCOUNTER — ROUTINE PRENATAL (OUTPATIENT)
Dept: OBSTETRICS AND GYNECOLOGY | Facility: CLINIC | Age: 28
End: 2023-05-11
Payer: MEDICAID

## 2023-05-11 ENCOUNTER — LAB VISIT (OUTPATIENT)
Dept: LAB | Facility: CLINIC | Age: 28
End: 2023-05-11
Payer: MEDICAID

## 2023-05-11 VITALS
BODY MASS INDEX: 42.03 KG/M2 | HEART RATE: 69 BPM | DIASTOLIC BLOOD PRESSURE: 66 MMHG | SYSTOLIC BLOOD PRESSURE: 128 MMHG | WEIGHT: 276.38 LBS

## 2023-05-11 DIAGNOSIS — Z72.0 TOBACCO USE: ICD-10-CM

## 2023-05-11 DIAGNOSIS — Z86.19 HISTORY OF HERPES GENITALIS: ICD-10-CM

## 2023-05-11 DIAGNOSIS — O99.210 OBESITY AFFECTING PREGNANCY, ANTEPARTUM: ICD-10-CM

## 2023-05-11 DIAGNOSIS — Z3A.28 28 WEEKS GESTATION OF PREGNANCY: Primary | ICD-10-CM

## 2023-05-11 DIAGNOSIS — F12.90 MARIJUANA USE DURING PREGNANCY: ICD-10-CM

## 2023-05-11 DIAGNOSIS — O99.320 MARIJUANA USE DURING PREGNANCY: ICD-10-CM

## 2023-05-11 DIAGNOSIS — Z3A.28 28 WEEKS GESTATION OF PREGNANCY: ICD-10-CM

## 2023-05-11 PROBLEM — Z3A.26 26 WEEKS GESTATION OF PREGNANCY: Status: RESOLVED | Noted: 2023-04-27 | Resolved: 2023-05-11

## 2023-05-11 PROBLEM — Z3A.25 25 WEEKS GESTATION OF PREGNANCY: Status: RESOLVED | Noted: 2023-04-18 | Resolved: 2023-05-11

## 2023-05-11 PROBLEM — Z3A.20 20 WEEKS GESTATION OF PREGNANCY: Status: RESOLVED | Noted: 2023-03-15 | Resolved: 2023-05-11

## 2023-05-11 PROCEDURE — 59426 ANTEPARTUM CARE ONLY: CPT | Mod: TH,S$GLB,, | Performed by: ADVANCED PRACTICE MIDWIFE

## 2023-05-11 PROCEDURE — 85025 COMPLETE CBC W/AUTO DIFF WBC: CPT | Performed by: ADVANCED PRACTICE MIDWIFE

## 2023-05-11 PROCEDURE — 59426 PR ANTEPARTUM CARE ONLY, >7 VISITS: ICD-10-PCS | Mod: TH,S$GLB,, | Performed by: ADVANCED PRACTICE MIDWIFE

## 2023-05-11 NOTE — PROGRESS NOTES
Mayda Buck is a 28 y.o. female  at 28 +4/7 weeks per US at 6 + 4/7 weeks in our MFM office. EDC = 23.  Doing well. No LOF/BRVB. Dysuria, fever/chills or abdominal pain. Good FM. No S&S of pre eclampsia. History of HSV 4 weeks ago while taking Valtrex for suppression but is considering primary C/S as well as transfer of care to [place].     History  OBGYN HX:    History HSV-taking Valtrex for suppression  Ovarian cyst  9.1 cm right complex ovarian cyst, 3.2 cm left ovarian cyst. Repeat US at 6 + 4/7 weeks = maternal right sided unilocular cyst measures 5.7 cm in mean diameter and demonstrates a single internal solid component that measures 10 mm in greatest diameter without papillary structures, internal vascularity or ascites. Findings are highly suggestive of benign pathology in a low risk patient. Recommend re-assessment in 6-8 weeks (with anatomy US). No further mention of cyst on follow up US.     Morbid Obesity-Growth then weekly BPP at 32 weeks.  Gap in care from 18-26 weeks  RH positive  + UDS for THC  Tobacco use  PMHX:  Fx of ankle  ALLERGY:  NKDA  Cat/Dog allergy  SURGHX:  Open reduction and fixation of ankle 2020  SOCHX:  Smoker  Denies etoh or substance use in pregnancy.    LABS:  B pos, abs neg  HIV neg  Rubella immune  RPR non reactive  Sickle Screen neg  GC CHL neg neg  Urine cx wnl  AFP neg  CBC 13/38   + UDS THC  HEP A B C neg  1 hour wpu=563  CBC=pending     Outpatient Medications Prior to Visit   Medication Sig Dispense Refill    ondansetron (ZOFRAN) 4 MG tablet Take 1 tablet (4 mg total) by mouth every 6 (six) hours as needed for Nausea. (Patient not taking: Reported on 2023) 30 tablet 1    promethazine (PHENERGAN) 25 MG suppository Place 1 suppository (25 mg total) rectally every 8 (eight) hours. (Patient not taking: Reported on 2023) 12 suppository 1    promethazine (PHENERGAN) 25 MG tablet Take 1 tablet (25 mg total) by mouth every 8 (eight)  hours. (Patient not taking: Reported on 2023) 30 tablet 1    valACYclovir (VALTREX) 500 MG tablet Take 500 mg by mouth.       No facility-administered medications prior to visit.       Review of patient's allergies indicates:   Allergen Reactions    Animal dander Hives and Other (See Comments)     Other reaction(s): Difficulty breathing    Cat's claw      Other reaction(s): Difficulty breathing, Hives, Sneezing    Cat dander      Other reaction(s): Other (See Comments)  Sneezing and eyes watering       No past medical history on file.    Past Surgical History:   Procedure Laterality Date    OPEN REDUCTION AND INTERNAL FIXATION (ORIF) OF FRACTURE OF LATERAL MALLEOLUS Right 2020    Procedure: ORIF, FRACTURE, FIBULA, LATERAL MALLEOLUS;  Surgeon: Nico Rasmussen DO;  Location: [facility] OR;  Service: Orthopedics;  Laterality: Right;  Equipment: [place] 28 Precontoured locking lateral malleous fracture set; 4-1 Cannulated HEADED screw set; [place] [employer] Pilon Plating System  Vendor/Rep: [place] [employer]  C-Arm: Entire  REQUIRES FIRST ASSISTANT STEPHEN    ORIF TIBIA FRACTURE Right 2020    Procedure: ORIF, FRACTURE, TIBIA;  Surgeon: Nico Rasmussen DO;  Location: [facility] OR;  Service: Orthopedics;  Laterality: Right;  Equipment: [place] 28 Precontoured locking lateral malleous fracture set; 4-1 Cannulated HEADED screw set; [place] [employer] Pilon Plating System  Vendor/Rep: [place] [employer]  C-Arm: Entire  REQUIRES FIRST ASSISTANT STEPHEN       OB History    Para Term  AB Living   1             SAB IAB Ectopic Multiple Live Births                  # Outcome Date GA Lbr Nicholas/2nd Weight Sex Delivery Anes PTL Lv   1 Current                Family History   Problem Relation Age of Onset    Hypertension Maternal Grandmother     Stroke Maternal Grandmother     Stroke Mother        Social History     Socioeconomic History    Marital status: Single   Tobacco Use    Smoking status: Never     Passive exposure: Never     Smokeless tobacco: Never   Substance and Sexual Activity    Alcohol use: Not Currently    Drug use: Not Currently     Types: Marijuana    Sexual activity: Yes     Partners: Male     Birth control/protection: None       Vitals:    05/11/23 1603   BP: 128/66   Pulse: 69       ROS:  GENERAL: Denies weight gain or weight loss. Feeling well overall.   SKIN: Denies rash or lesions.   HEAD: Denies head injury or headache.   NODES: Denies enlarged lymph nodes.   CHEST: Denies chest pain or shortness of breath.   CARDIOVASCULAR: Denies palpitations or left sided chest pain.   ABDOMEN: No abdominal pain, constipation, diarrhea, or rectal bleeding. Mild N&V.  URINARY: No frequency, dysuria, hematuria, or burning on urination.  REPRODUCTIVE: See HPI.   BREASTS: The patient performs breast self-examination and denies pain, lumps, or nipple discharge.   HEMATOLOGIC: No easy bruisability or excessive bleeding.   MUSCULOSKELETAL: Denies joint pain or swelling.   NEUROLOGIC: Denies syncope or weakness.   PSYCHIATRIC: Denies depression, anxiety or mood swings.    Fhts 140s    Physical Exam:   APPEARANCE: Well nourished, well developed, in no acute distress.  SKIN: Normal skin turgor, no lesions.  ABDOMEN: Soft. No tenderness or masses. No hepatosplenomegaly. No hernias. S=D, 28 cm  EXTREMITIES: NO clubbing cyanosis or edema      ASSESSMENT  Encounter Diagnoses   Name Primary?    28 weeks gestation of pregnancy Yes    History of herpes genitalis     Tobacco use     Obesity affecting pregnancy, antepartum     Marijuana use during pregnancy        PLAN  1. S&S of PTL/PPROM and pre eclampsia discussed. C discussed.  2. Continue daily pnv and Valtrex for HSV suppression.  3.  Tobacco cessation discussed previously.  4.  Incomplete visualization of some fetal anatomy on US. Re-eval at 32 weeks with growth. Has US scheduled.  5.  Weekly BPP at 32 weeks due to obesity. Has scheduled.  6.  Importance of prenatal care stressed.  7.   Growth at 32 and 3rd trimester AP testing due to BMI discussed. Has scheduled.  8.  HX + UDS. Has stopped using THC. Plan repeat UDS later in pregnancy.  9.  1 hour gtt 123 wnl discussed. CBC was not drawn and will be done today.  10.  NIKHIL 2 weeks with Dr. Stephen as desires to discuss primary C/S.

## 2023-05-12 LAB
BASOPHILS # BLD AUTO: 0.02 K/UL (ref 0–0.2)
BASOPHILS NFR BLD: 0.2 % (ref 0–1.9)
DIFFERENTIAL METHOD: ABNORMAL
EOSINOPHIL # BLD AUTO: 0.1 K/UL (ref 0–0.5)
EOSINOPHIL NFR BLD: 0.6 % (ref 0–8)
ERYTHROCYTE [DISTWIDTH] IN BLOOD BY AUTOMATED COUNT: 13.2 % (ref 11.5–14.5)
HCT VFR BLD AUTO: 38.3 % (ref 37–48.5)
HGB BLD-MCNC: 13 G/DL (ref 12–16)
IMM GRANULOCYTES # BLD AUTO: 0.02 K/UL (ref 0–0.04)
IMM GRANULOCYTES NFR BLD AUTO: 0.2 % (ref 0–0.5)
LYMPHOCYTES # BLD AUTO: 2.5 K/UL (ref 1–4.8)
LYMPHOCYTES NFR BLD: 27 % (ref 18–48)
MCH RBC QN AUTO: 31.3 PG (ref 27–31)
MCHC RBC AUTO-ENTMCNC: 33.9 G/DL (ref 32–36)
MCV RBC AUTO: 92 FL (ref 82–98)
MONOCYTES # BLD AUTO: 0.7 K/UL (ref 0.3–1)
MONOCYTES NFR BLD: 7.8 % (ref 4–15)
NEUTROPHILS # BLD AUTO: 6 K/UL (ref 1.8–7.7)
NEUTROPHILS NFR BLD: 64.2 % (ref 38–73)
NRBC BLD-RTO: 0 /100 WBC
PLATELET # BLD AUTO: 200 K/UL (ref 150–450)
PMV BLD AUTO: 11.5 FL (ref 9.2–12.9)
RBC # BLD AUTO: 4.16 M/UL (ref 4–5.4)
WBC # BLD AUTO: 9.35 K/UL (ref 3.9–12.7)

## 2023-05-12 PROCEDURE — 36415 PR COLLECTION VENOUS BLOOD,VENIPUNCTURE: ICD-10-PCS | Mod: ,,, | Performed by: STUDENT IN AN ORGANIZED HEALTH CARE EDUCATION/TRAINING PROGRAM

## 2023-05-12 PROCEDURE — 36415 COLL VENOUS BLD VENIPUNCTURE: CPT | Mod: ,,, | Performed by: STUDENT IN AN ORGANIZED HEALTH CARE EDUCATION/TRAINING PROGRAM

## 2023-05-21 ENCOUNTER — PATIENT MESSAGE (OUTPATIENT)
Dept: OTHER | Facility: OTHER | Age: 28
End: 2023-05-21
Payer: COMMERCIAL

## 2023-06-04 ENCOUNTER — PATIENT MESSAGE (OUTPATIENT)
Dept: OTHER | Facility: OTHER | Age: 28
End: 2023-06-04
Payer: COMMERCIAL

## 2023-06-05 ENCOUNTER — PROCEDURE VISIT (OUTPATIENT)
Dept: MATERNAL FETAL MEDICINE | Facility: CLINIC | Age: 28
End: 2023-06-05
Payer: MEDICAID

## 2023-06-05 DIAGNOSIS — Z36.89 ENCOUNTER FOR ULTRASOUND TO ASSESS FETAL GROWTH: ICD-10-CM

## 2023-06-05 PROCEDURE — 76816 US OB/GYN PROCEDURE (VIEWPOINT): ICD-10-PCS | Mod: S$GLB,,, | Performed by: OBSTETRICS & GYNECOLOGY

## 2023-06-05 PROCEDURE — 76816 OB US FOLLOW-UP PER FETUS: CPT | Mod: S$GLB,,, | Performed by: OBSTETRICS & GYNECOLOGY

## 2023-06-09 ENCOUNTER — ROUTINE PRENATAL (OUTPATIENT)
Dept: OBSTETRICS AND GYNECOLOGY | Facility: CLINIC | Age: 28
End: 2023-06-09
Payer: MEDICAID

## 2023-06-09 ENCOUNTER — TELEPHONE (OUTPATIENT)
Dept: MATERNAL FETAL MEDICINE | Facility: CLINIC | Age: 28
End: 2023-06-09
Payer: COMMERCIAL

## 2023-06-09 VITALS — DIASTOLIC BLOOD PRESSURE: 77 MMHG | SYSTOLIC BLOOD PRESSURE: 125 MMHG | WEIGHT: 267 LBS | BODY MASS INDEX: 40.6 KG/M2

## 2023-06-09 DIAGNOSIS — O35.BXX0 ANOMALY OF HEART OF FETUS AFFECTING PREGNANCY, ANTEPARTUM, SINGLE OR UNSPECIFIED FETUS: ICD-10-CM

## 2023-06-09 DIAGNOSIS — F12.90 MARIJUANA USE DURING PREGNANCY: ICD-10-CM

## 2023-06-09 DIAGNOSIS — O99.320 MARIJUANA USE DURING PREGNANCY: ICD-10-CM

## 2023-06-09 DIAGNOSIS — Z72.0 TOBACCO USE: ICD-10-CM

## 2023-06-09 DIAGNOSIS — Z36.89 ENCOUNTER FOR ULTRASOUND TO ASSESS FETAL GROWTH: Primary | ICD-10-CM

## 2023-06-09 DIAGNOSIS — O99.210 OBESITY AFFECTING PREGNANCY, ANTEPARTUM: ICD-10-CM

## 2023-06-09 DIAGNOSIS — Z3A.32 32 WEEKS GESTATION OF PREGNANCY: Primary | ICD-10-CM

## 2023-06-09 DIAGNOSIS — Z86.19 HISTORY OF HERPES GENITALIS: ICD-10-CM

## 2023-06-09 PROCEDURE — 59426 PR ANTEPARTUM CARE ONLY, >7 VISITS: ICD-10-PCS | Mod: TH,S$GLB,,

## 2023-06-09 PROCEDURE — 59426 ANTEPARTUM CARE ONLY: CPT | Mod: TH,S$GLB,,

## 2023-06-09 NOTE — PROGRESS NOTES
Mayda Buck is a 28 y.o. female  at 32 +4/7 weeks per US at 6 + 4/7 weeks in our MFM office. EDC = 23.    Doing well. No LOF/BRVB. Dysuria, fever/chills or abdominal pain. Good FM. No S&S of pre eclampsia. History of HSV 4 weeks ago while taking Valtrex for suppression.    Pt desires primary C/S for delivery due to history of genital herpes with outbreaks despite suppression therapy in pregnancy. Patient also desires transfer of care to [place] [facility].     Growth u/s at 32w1d EFW 4#6oz 33%; AC 32%. Normal interval growth. Still major suboptimal cardiac views of RVOT and LVOT. MFM consult reviewed and discussed. Patient agreeable to plan of care.     BMI > 40. Weekly AP testing to start at 34 weeks. Scheduled with MFM.     Patient states that she desires a primary . Due to her history of genital herpes and having outbreaks even while on valtrex suppression. Patient states she desires delivery in [place] with the OBGYN group at [facility]. Contact info for [facility] scheduling previously provided. Patient states she is having difficulty contacting them. I have reached out to [facility] scheduling to have patient scheduled.     Patient also reporting she desires to go into labor spontaneously and then proceed with . I discussed with patient most common procedure for pre-planned c-sections is scheduling the week prior to JOHNATHON. However she will need to further discuss these desires with physician set to perform . Patient verbalizes understanding.     TDAP reviewed and discussed. Patient unsure whether she wants TDAP or not. Will need TDAP follow up at next visit.     History  OBGYN HX:    History HSV-taking Valtrex for suppression  Ovarian cyst  9.1 cm right complex ovarian cyst, 3.2 cm left ovarian cyst. Repeat US at 6 + 4/7 weeks = maternal right sided unilocular cyst measures 5.7 cm in mean diameter and demonstrates a single internal solid component  that measures 10 mm in greatest diameter without papillary structures, internal vascularity or ascites. Findings are highly suggestive of benign pathology in a low risk patient. Recommend re-assessment in 6-8 weeks (with anatomy US). No further mention of cyst on follow up US.     Morbid Obesity-Growth then weekly BPP at 32 weeks.  Gap in care from 18-26 weeks  RH positive  + UDS for THC  Tobacco use  HSV    PMHX:  Fx of ankle    ALLERGY:  NKDA  Cat/Dog allergy    SURGHX:  Open reduction and fixation of ankle 7/2020    SOCHX:  Smoker  Denies etoh or substance use in pregnancy.    LABS:  B pos, abs neg  HIV neg  Rubella immune  RPR non reactive  Sickle Screen neg  GC CHL neg neg  Urine cx wnl  AFP neg  CBC 13/38   + UDS THC  HEP A B C neg  1 hour qfj=215  CBC= 13/38.3/200    Outpatient Medications Prior to Visit   Medication Sig Dispense Refill    ondansetron (ZOFRAN) 4 MG tablet Take 1 tablet (4 mg total) by mouth every 6 (six) hours as needed for Nausea. (Patient not taking: Reported on 1/19/2023) 30 tablet 1    promethazine (PHENERGAN) 25 MG suppository Place 1 suppository (25 mg total) rectally every 8 (eight) hours. (Patient not taking: Reported on 1/19/2023) 12 suppository 1    promethazine (PHENERGAN) 25 MG tablet Take 1 tablet (25 mg total) by mouth every 8 (eight) hours. (Patient not taking: Reported on 1/19/2023) 30 tablet 1    valACYclovir (VALTREX) 500 MG tablet Take 500 mg by mouth.       No facility-administered medications prior to visit.       Review of patient's allergies indicates:   Allergen Reactions    Animal dander Hives and Other (See Comments)     Other reaction(s): Difficulty breathing    Cat's claw      Other reaction(s): Difficulty breathing, Hives, Sneezing    Cat dander      Other reaction(s): Other (See Comments)  Sneezing and eyes watering       History reviewed. No pertinent past medical history.    Past Surgical History:   Procedure Laterality Date    OPEN REDUCTION AND  INTERNAL FIXATION (ORIF) OF FRACTURE OF LATERAL MALLEOLUS Right 2020    Procedure: ORIF, FRACTURE, FIBULA, LATERAL MALLEOLUS;  Surgeon: Nico Rasmussen DO;  Location: [facility] OR;  Service: Orthopedics;  Laterality: Right;  Equipment: [place] 28 Precontoured locking lateral malleous fracture set; 4-1 Cannulated HEADED screw set; [place] [employer] Pilon Plating System  Vendor/Rep: [place] [employer]  C-Arm: Entire  REQUIRES FIRST ASSISTANT STEPHEN    ORIF TIBIA FRACTURE Right 2020    Procedure: ORIF, FRACTURE, TIBIA;  Surgeon: Nico Rasmussen DO;  Location: [facility] OR;  Service: Orthopedics;  Laterality: Right;  Equipment: [place] 28 Precontoured locking lateral malleous fracture set; 4-1 Cannulated HEADED screw set; [place] [employer] Pilon Plating System  Vendor/Rep: [place] [employer]  C-Arm: Entire  REQUIRES FIRST ASSISTANT STEPHEN       OB History    Para Term  AB Living   1             SAB IAB Ectopic Multiple Live Births                  # Outcome Date GA Lbr Nicholas/2nd Weight Sex Delivery Anes PTL Lv   1 Current                Family History   Problem Relation Age of Onset    Hypertension Maternal Grandmother     Stroke Maternal Grandmother     Stroke Mother        Social History     Socioeconomic History    Marital status: Single   Tobacco Use    Smoking status: Never     Passive exposure: Never    Smokeless tobacco: Never   Substance and Sexual Activity    Alcohol use: Not Currently    Drug use: Not Currently     Types: Marijuana    Sexual activity: Yes     Partners: Male     Birth control/protection: None       Vitals:    23 1328   BP: 125/77       ROS:  GENERAL: Denies weight gain or weight loss. Feeling well overall.   SKIN: Denies rash or lesions.   HEAD: Denies head injury or headache.   NODES: Denies enlarged lymph nodes.   CHEST: Denies chest pain or shortness of breath.   CARDIOVASCULAR: Denies palpitations or left sided chest pain.   ABDOMEN: No abdominal pain, constipation, diarrhea, or rectal bleeding. Mild  N&V.  URINARY: No frequency, dysuria, hematuria, or burning on urination.  REPRODUCTIVE: See HPI.   BREASTS: The patient performs breast self-examination and denies pain, lumps, or nipple discharge.   HEMATOLOGIC: No easy bruisability or excessive bleeding.   MUSCULOSKELETAL: Denies joint pain or swelling.   NEUROLOGIC: Denies syncope or weakness.   PSYCHIATRIC: Denies depression, anxiety or mood swings.    Fhts 140s    Physical Exam:   APPEARANCE: Well nourished, well developed, in no acute distress.  SKIN: Normal skin turgor, no lesions.  ABDOMEN: Soft. No tenderness or masses. No hepatosplenomegaly. No hernias. S=D, 33 cm  EXTREMITIES: NO clubbing cyanosis or edema      ASSESSMENT  Encounter Diagnoses   Name Primary?    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 FMC discussed.  Continue daily pnv and Valtrex for HSV suppression.  3.  Tobacco cessation discussed previously.  4.  Incomplete visualization of some fetal anatomy on US at 32 weeks. MFM consult and u/s scheduled.   5.  Growth u/s at 32w1d EFW 4#6oz 33%; AC 32%. Normal interval growth.   6.  Weekly BPP at 34 weeks due to obesity. Has scheduled.  7.  Importance of prenatal care stressed.  8.  HX + UDS. Has stopped using THC. Plan repeat UDS later in pregnancy.  9.  Normal 1 hr GTT and CBC.   10.  TDAP reviewed and discussed today. Patient undecided. Follow up at next visit.   11.  NIKHIL 2 weeks.

## 2023-06-09 NOTE — LETTER
June 9, 2023      [facility] - Obstetrics And Gynecology  [address] [place] [address]  Phone: [phone]  Fax: [phone]       Patient: Mayda Buck   YOB: 1995  Date of Visit: 06/09/2023    To Whom It May Concern:    Abdoulaye Buck  has recently been ill with ear infection and cold. She was seen in ER on 06/06/2023. She was also seen at [facility] on 06/09/2023 for her prenatal appt. Ms. Buck also has prenatal and ultrasound visits scheduled in [place] the week of 06/12/2023-06/16/2023. She may return to work/school on 06/18/2023 with no restrictions.      If you have any questions or concerns, or if I can be of further assistance, please do not hesitate to contact me.    Sincerely,    Keely Waldrop CNM

## 2023-06-09 NOTE — TELEPHONE ENCOUNTER
Pt verified self by name, .   Pt requested soonest avail appt with MFM.   Pt accepted appt Monday @ 840 in [facility].  Pt verbalized understanding. Agreed to POC w intent to comply.

## 2023-06-12 ENCOUNTER — OFFICE VISIT (OUTPATIENT)
Dept: MATERNAL FETAL MEDICINE | Facility: CLINIC | Age: 28
End: 2023-06-12
Payer: MEDICAID

## 2023-06-12 ENCOUNTER — PROCEDURE VISIT (OUTPATIENT)
Dept: MATERNAL FETAL MEDICINE | Facility: CLINIC | Age: 28
End: 2023-06-12
Payer: MEDICAID

## 2023-06-12 VITALS — SYSTOLIC BLOOD PRESSURE: 116 MMHG | BODY MASS INDEX: 41.2 KG/M2 | WEIGHT: 270.94 LBS | DIASTOLIC BLOOD PRESSURE: 74 MMHG

## 2023-06-12 DIAGNOSIS — O35.BXX0 ANOMALY OF HEART OF FETUS AFFECTING PREGNANCY, ANTEPARTUM, SINGLE OR UNSPECIFIED FETUS: ICD-10-CM

## 2023-06-12 DIAGNOSIS — Z3A.32 32 WEEKS GESTATION OF PREGNANCY: ICD-10-CM

## 2023-06-12 DIAGNOSIS — Z36.89 ENCOUNTER FOR ULTRASOUND TO ASSESS FETAL GROWTH: ICD-10-CM

## 2023-06-12 DIAGNOSIS — Z03.73 SUSPECTED FETAL ANOMALY NOT FOUND: Primary | ICD-10-CM

## 2023-06-12 PROCEDURE — 1159F PR MEDICATION LIST DOCUMENTED IN MEDICAL RECORD: ICD-10-PCS | Mod: CPTII,S$GLB,, | Performed by: OBSTETRICS & GYNECOLOGY

## 2023-06-12 PROCEDURE — 3074F SYST BP LT 130 MM HG: CPT | Mod: CPTII,S$GLB,, | Performed by: OBSTETRICS & GYNECOLOGY

## 2023-06-12 PROCEDURE — 76819 US MFM PROCEDURE (VIEWPOINT): ICD-10-PCS | Mod: S$GLB,,, | Performed by: OBSTETRICS & GYNECOLOGY

## 2023-06-12 PROCEDURE — 3008F PR BODY MASS INDEX (BMI) DOCUMENTED: ICD-10-PCS | Mod: CPTII,S$GLB,, | Performed by: OBSTETRICS & GYNECOLOGY

## 2023-06-12 PROCEDURE — 99999 PR PBB SHADOW E&M-EST. PATIENT-LVL III: CPT | Mod: PBBFAC,,, | Performed by: OBSTETRICS & GYNECOLOGY

## 2023-06-12 PROCEDURE — 99214 OFFICE O/P EST MOD 30 MIN: CPT | Mod: 25,S$GLB,, | Performed by: OBSTETRICS & GYNECOLOGY

## 2023-06-12 PROCEDURE — 3008F BODY MASS INDEX DOCD: CPT | Mod: CPTII,S$GLB,, | Performed by: OBSTETRICS & GYNECOLOGY

## 2023-06-12 PROCEDURE — 3078F DIAST BP <80 MM HG: CPT | Mod: CPTII,S$GLB,, | Performed by: OBSTETRICS & GYNECOLOGY

## 2023-06-12 PROCEDURE — 99214 PR OFFICE/OUTPT VISIT, EST, LEVL IV, 30-39 MIN: ICD-10-PCS | Mod: 25,S$GLB,, | Performed by: OBSTETRICS & GYNECOLOGY

## 2023-06-12 PROCEDURE — 99999 PR PBB SHADOW E&M-EST. PATIENT-LVL III: ICD-10-PCS | Mod: PBBFAC,,, | Performed by: OBSTETRICS & GYNECOLOGY

## 2023-06-12 PROCEDURE — 76819 FETAL BIOPHYS PROFIL W/O NST: CPT | Mod: PBBFAC | Performed by: OBSTETRICS & GYNECOLOGY

## 2023-06-12 PROCEDURE — 3078F PR MOST RECENT DIASTOLIC BLOOD PRESSURE < 80 MM HG: ICD-10-PCS | Mod: CPTII,S$GLB,, | Performed by: OBSTETRICS & GYNECOLOGY

## 2023-06-12 PROCEDURE — 99213 OFFICE O/P EST LOW 20 MIN: CPT | Mod: PBBFAC,TH,25 | Performed by: OBSTETRICS & GYNECOLOGY

## 2023-06-12 PROCEDURE — 1159F MED LIST DOCD IN RCRD: CPT | Mod: CPTII,S$GLB,, | Performed by: OBSTETRICS & GYNECOLOGY

## 2023-06-12 PROCEDURE — 3074F PR MOST RECENT SYSTOLIC BLOOD PRESSURE < 130 MM HG: ICD-10-PCS | Mod: CPTII,S$GLB,, | Performed by: OBSTETRICS & GYNECOLOGY

## 2023-06-12 NOTE — PROGRESS NOTES
MATERNAL-FETAL MEDICINE   CONSULT NOTE    Provider requesting consultation: Virginia Cloud CNM    SUBJECTIVE:     Ms. Mayda Buck is a 28 y.o.  female with IUP at 33w1d who is seen in consultation by MFM for evaluation and management of:  Suboptimal Cardiac Views          Medication List with Changes/Refills   Current Medications    ONDANSETRON (ZOFRAN) 4 MG TABLET    Take 1 tablet (4 mg total) by mouth every 6 (six) hours as needed for Nausea.    PROMETHAZINE (PHENERGAN) 25 MG SUPPOSITORY    Place 1 suppository (25 mg total) rectally every 8 (eight) hours.    PROMETHAZINE (PHENERGAN) 25 MG TABLET    Take 1 tablet (25 mg total) by mouth every 8 (eight) hours.    VALACYCLOVIR (VALTREX) 500 MG TABLET    Take 500 mg by mouth.       Review of patient's allergies indicates:   Allergen Reactions    Animal dander Hives and Other (See Comments)     Other reaction(s): Difficulty breathing    Cat's claw      Other reaction(s): Difficulty breathing, Hives, Sneezing    Cat dander      Other reaction(s): Other (See Comments)  Sneezing and eyes watering       PMH:No past medical history on file.    PObHx:  OB History    Para Term  AB Living   1             SAB IAB Ectopic Multiple Live Births                  # Outcome Date GA Lbr Nicholas/2nd Weight Sex Delivery Anes PTL Lv   1 Current                PSH:  Past Surgical History:   Procedure Laterality Date    OPEN REDUCTION AND INTERNAL FIXATION (ORIF) OF FRACTURE OF LATERAL MALLEOLUS Right 2020    Procedure: ORIF, FRACTURE, FIBULA, LATERAL MALLEOLUS;  Surgeon: Nico Rasmussen DO;  Location: [facility] OR;  Service: Orthopedics;  Laterality: Right;  Equipment: [place] 28 Precontoured locking lateral malleous fracture set; 4-1 Cannulated HEADED screw set; [place] 28 Pilon Plating System  Vendor/Rep: [place] 28  C-Arm: Entire  REQUIRES FIRST ASSISTANT STEPHEN    ORIF TIBIA FRACTURE Right 2020    Procedure: ORIF, FRACTURE, TIBIA;  Surgeon: Nico HANDY  DO Valery;  Location: [facility] OR;  Service: Orthopedics;  Laterality: Right;  Equipment: [place] 28 Precontoured locking lateral malleous fracture set; 4-1 Cannulated HEADED screw set; [place] 28 Pilon Plating System  Vendor/Rep: [place] 28  C-Arm: Entire  REQUIRES FIRST ASSISTANT STEPHEN       Family history:family history includes Hypertension in her maternal grandmother; Stroke in her maternal grandmother and mother.    Social history: reports that she has never smoked. She has never been exposed to tobacco smoke. She has never used smokeless tobacco. She reports that she does not currently use alcohol. She reports that she does not currently use drugs after having used the following drugs: Marijuana.    Genetic history:  The patient denies any inherited genetic diseases or birth defects in herself or her partner's personal history or family.    Objective:   /74 (BP Location: Left arm, Patient Position: Sitting)   Wt 122.9 kg (270 lb 15.1 oz)   BMI 41.20 kg/m²      Physical Exam: WDWN in NAD     Ultrasound performed. See viewpoint for full ultrasound report.  The BPP score is reassuring at 8/8, and the MVP is normal.   Limited ultrasound confirms normal appearing cardiac views.         ASSESSMENT/PLAN:     28 y.o.  female with IUP at 33w1d     Suboptimal Cardiac Views:  MFM Referral for completion of the anatomic survey:  Patient seen at [place].  Cardiac anatomy remained suboptimal after two attempts.  Patient desired FU at [facility].  Views have been limited but an abnormality is not suspected.   US today, as above.      FOLLOW UP:   No MFM follow up is needed.   Limitations of US were reviewed and indication for additional evaluation was reviewed.   testing is reassuring.   Patient desires to have Primary CS for history of HSV with relatively frequent recurrences.  She desires to deliver at [facility].  We have given the patient the telephone number to reach our General OB  practice to schedule a new patient visit.  [facility] does not provide primary obstetric care.  Patient is aware and is happy to see one of our generalists  in referral.       30 minutes of total time spent on the encounter, which includes face to face time and non-face to face time preparing to see the patient (eg, review of tests), obtaining and/or reviewing separately obtained history, documenting clinical information in the electronic or other health record, independently interpreting results (not separately reported) and communicating results to the patient/family/caregiver, or care coordination (not separately reported).      Fallon Cabezas  Maternal-Fetal Medicine    Electronically Signed by Fallon Cabezas June 12, 2023

## 2023-06-16 PROBLEM — Z3A.28 28 WEEKS GESTATION OF PREGNANCY: Status: RESOLVED | Noted: 2023-05-11 | Resolved: 2023-06-16

## 2023-06-19 ENCOUNTER — PROCEDURE VISIT (OUTPATIENT)
Dept: MATERNAL FETAL MEDICINE | Facility: CLINIC | Age: 28
End: 2023-06-19
Payer: COMMERCIAL

## 2023-06-19 PROCEDURE — 76819 US OB/GYN PROCEDURE (VIEWPOINT): ICD-10-PCS | Mod: S$GLB,,, | Performed by: OBSTETRICS & GYNECOLOGY

## 2023-06-19 PROCEDURE — 76819 FETAL BIOPHYS PROFIL W/O NST: CPT | Mod: S$GLB,,, | Performed by: OBSTETRICS & GYNECOLOGY

## 2023-06-25 ENCOUNTER — PATIENT MESSAGE (OUTPATIENT)
Dept: OTHER | Facility: OTHER | Age: 28
End: 2023-06-25
Payer: COMMERCIAL

## 2023-06-26 ENCOUNTER — PROCEDURE VISIT (OUTPATIENT)
Dept: MATERNAL FETAL MEDICINE | Facility: CLINIC | Age: 28
End: 2023-06-26
Payer: MEDICAID

## 2023-06-26 ENCOUNTER — ROUTINE PRENATAL (OUTPATIENT)
Dept: OBSTETRICS AND GYNECOLOGY | Facility: CLINIC | Age: 28
End: 2023-06-26
Payer: COMMERCIAL

## 2023-06-26 ENCOUNTER — LAB VISIT (OUTPATIENT)
Dept: LAB | Facility: OTHER | Age: 28
End: 2023-06-26
Payer: COMMERCIAL

## 2023-06-26 VITALS
DIASTOLIC BLOOD PRESSURE: 63 MMHG | SYSTOLIC BLOOD PRESSURE: 101 MMHG | WEIGHT: 273.38 LBS | BODY MASS INDEX: 41.57 KG/M2

## 2023-06-26 DIAGNOSIS — Z3A.35 35 WEEKS GESTATION OF PREGNANCY: Primary | ICD-10-CM

## 2023-06-26 DIAGNOSIS — N89.8 VAGINAL DISCHARGE: ICD-10-CM

## 2023-06-26 DIAGNOSIS — Z86.19 HX OF HERPES GENITALIS: ICD-10-CM

## 2023-06-26 DIAGNOSIS — E66.9 OBESITY: Primary | ICD-10-CM

## 2023-06-26 DIAGNOSIS — Z3A.35 35 WEEKS GESTATION OF PREGNANCY: ICD-10-CM

## 2023-06-26 LAB — HIV 1+2 AB+HIV1 P24 AG SERPL QL IA: NORMAL

## 2023-06-26 PROCEDURE — 0502F SUBSEQUENT PRENATAL CARE: CPT | Mod: S$GLB,,,

## 2023-06-26 PROCEDURE — 86592 SYPHILIS TEST NON-TREP QUAL: CPT

## 2023-06-26 PROCEDURE — 87591 N.GONORRHOEAE DNA AMP PROB: CPT

## 2023-06-26 PROCEDURE — 99999 PR PBB SHADOW E&M-EST. PATIENT-LVL III: ICD-10-PCS | Mod: PBBFAC,,,

## 2023-06-26 PROCEDURE — 81514 NFCT DS BV&VAGINITIS DNA ALG: CPT

## 2023-06-26 PROCEDURE — 99213 OFFICE O/P EST LOW 20 MIN: CPT | Mod: PBBFAC

## 2023-06-26 PROCEDURE — 99999 PR PBB SHADOW E&M-EST. PATIENT-LVL III: CPT | Mod: PBBFAC,,,

## 2023-06-26 PROCEDURE — 87147 CULTURE TYPE IMMUNOLOGIC: CPT

## 2023-06-26 PROCEDURE — 87389 HIV-1 AG W/HIV-1&-2 AB AG IA: CPT

## 2023-06-26 PROCEDURE — 76819 FETAL BIOPHYS PROFIL W/O NST: CPT | Mod: S$GLB,,, | Performed by: OBSTETRICS & GYNECOLOGY

## 2023-06-26 PROCEDURE — 76819 US OB/GYN PROCEDURE (VIEWPOINT): ICD-10-PCS | Mod: S$GLB,,, | Performed by: OBSTETRICS & GYNECOLOGY

## 2023-06-26 PROCEDURE — 87081 CULTURE SCREEN ONLY: CPT

## 2023-06-26 PROCEDURE — 0502F PR SUBSEQUENT PRENATAL CARE: ICD-10-PCS | Mod: S$GLB,,,

## 2023-06-26 PROCEDURE — 36415 COLL VENOUS BLD VENIPUNCTURE: CPT

## 2023-06-26 RX ORDER — B-COMPLEX WITH VITAMIN C
1 TABLET ORAL DAILY
Qty: 90 TABLET | Refills: 0 | Status: SHIPPED | OUTPATIENT
Start: 2023-06-26 | End: 2023-09-24

## 2023-06-26 NOTE — PATIENT INSTRUCTIONS
Call L & D after hours at [phone] for vaginal bleeding, leakage of fluids, regular contractions every 5 mins for 2 hours, decreased fetal movements ( 10 kicks in 2 hours), headache not relieved by Tylenol, blurry vision, or temp of 100.4 or greater.  Begin doing fetal kick counts, at least 10 movements in 2 hours starting at 28 weeks gestation.  Keep next clinic appointment.

## 2023-06-26 NOTE — LETTER
June 26, 2023      [facility]  [address] [address]  Phone: [phone]  Fax: [phone]       Patient: Mayda Buck   YOB: 1995  Date of Visit: 06/26/2023    To Whom It May Concern:    Abdoulaye Buck  was at [facility] on 06/26/2023. The patient may return to work/school on 06/28/2023. If you have any questions or concerns, or if I can be of further assistance, please do not hesitate to contact me.    Sincerely,    Hardik Wolf MA

## 2023-06-26 NOTE — PROGRESS NOTES
Here for routine OB appt at 35w1d. Reports good FM.  Denies LOF, denies VB, denies contractions. Does have back pain sometimes and pelvic pressure. Discussed belly bands. Pt getting care in [place], but wants to deliver at [facility] 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 Currently taking valtrex daily. Also reports vaginal discharge. GC and AFFIRM collected.  - Ultrasound done today, 8/8 BPP  - Has U/S, weekly BPP scheduled in [place]  - GBS done today  - 3T labs ordered   Reviewed warning signs of Labor and Preeclampsia.  Daily FM counts reinforced.  F/U scheduled 2 weeks with Dr. Heck

## 2023-06-27 ENCOUNTER — PATIENT MESSAGE (OUTPATIENT)
Dept: RESEARCH | Facility: HOSPITAL | Age: 28
End: 2023-06-27
Payer: COMMERCIAL

## 2023-06-27 LAB
BACTERIA SPEC AEROBE CULT: ABNORMAL
RPR SER QL: NORMAL

## 2023-06-28 ENCOUNTER — PATIENT MESSAGE (OUTPATIENT)
Dept: OBSTETRICS AND GYNECOLOGY | Facility: CLINIC | Age: 28
End: 2023-06-28
Payer: COMMERCIAL

## 2023-06-28 ENCOUNTER — TELEPHONE (OUTPATIENT)
Dept: OBSTETRICS AND GYNECOLOGY | Facility: CLINIC | Age: 28
End: 2023-06-28
Payer: COMMERCIAL

## 2023-06-28 DIAGNOSIS — B37.31 YEAST VAGINITIS: ICD-10-CM

## 2023-06-28 DIAGNOSIS — N76.0 BV (BACTERIAL VAGINOSIS): Primary | ICD-10-CM

## 2023-06-28 DIAGNOSIS — B96.89 BV (BACTERIAL VAGINOSIS): Primary | ICD-10-CM

## 2023-06-28 LAB
BACTERIAL VAGINOSIS DNA: POSITIVE
C TRACH DNA SPEC QL NAA+PROBE: NOT DETECTED
CANDIDA GLABRATA DNA: NEGATIVE
CANDIDA KRUSEI DNA: NEGATIVE
CANDIDA RRNA VAG QL PROBE: POSITIVE
N GONORRHOEA DNA SPEC QL NAA+PROBE: NOT DETECTED
T VAGINALIS RRNA GENITAL QL PROBE: NEGATIVE

## 2023-06-28 RX ORDER — METRONIDAZOLE 500 MG/1
500 TABLET ORAL EVERY 12 HOURS
Qty: 14 TABLET | Refills: 0 | Status: SHIPPED | OUTPATIENT
Start: 2023-06-28 | End: 2023-07-05

## 2023-06-28 RX ORDER — TERCONAZOLE 4 MG/G
1 CREAM VAGINAL NIGHTLY
Qty: 45 G | Refills: 0 | Status: SHIPPED | OUTPATIENT
Start: 2023-06-28 | End: 2023-07-05

## 2023-06-28 NOTE — TELEPHONE ENCOUNTER
----- Message from Alejandra Holley sent at 6/28/2023  1:25 PM CDT -----  Regarding: dr urena  Name of Who is Calling:HALINA MEREDITH [[id number]]          What is the request in detail: Pt is asking if she can get a note that states that she has no restrictions and can return to work. She needs the note 6/19&20. She is asking if you can email the note to her personal email. [email or web address]           Can the clinic reply by [facility]:yes          What Number to Call Back if not in [facility]:[phone]

## 2023-07-03 ENCOUNTER — PROCEDURE VISIT (OUTPATIENT)
Dept: MATERNAL FETAL MEDICINE | Facility: CLINIC | Age: 28
End: 2023-07-03
Payer: COMMERCIAL

## 2023-07-03 DIAGNOSIS — E66.9 OBESITY: ICD-10-CM

## 2023-07-03 DIAGNOSIS — E66.9 OBESITY: Primary | ICD-10-CM

## 2023-07-03 PROCEDURE — 76819 US OB/GYN PROCEDURE (VIEWPOINT): ICD-10-PCS | Mod: S$GLB,,, | Performed by: OBSTETRICS & GYNECOLOGY

## 2023-07-03 PROCEDURE — 76816 US OB/GYN PROCEDURE (VIEWPOINT): ICD-10-PCS | Mod: S$GLB,,, | Performed by: OBSTETRICS & GYNECOLOGY

## 2023-07-03 PROCEDURE — 76819 FETAL BIOPHYS PROFIL W/O NST: CPT | Mod: S$GLB,,, | Performed by: OBSTETRICS & GYNECOLOGY

## 2023-07-03 PROCEDURE — 76816 OB US FOLLOW-UP PER FETUS: CPT | Mod: S$GLB,,, | Performed by: OBSTETRICS & GYNECOLOGY

## 2023-07-05 ENCOUNTER — PATIENT MESSAGE (OUTPATIENT)
Dept: RESEARCH | Facility: HOSPITAL | Age: 28
End: 2023-07-05
Payer: COMMERCIAL

## 2023-07-10 ENCOUNTER — PROCEDURE VISIT (OUTPATIENT)
Dept: MATERNAL FETAL MEDICINE | Facility: CLINIC | Age: 28
End: 2023-07-10
Payer: COMMERCIAL

## 2023-07-10 DIAGNOSIS — E66.9 OBESITY: ICD-10-CM

## 2023-07-10 DIAGNOSIS — E66.9 OBESITY: Primary | ICD-10-CM

## 2023-07-10 PROCEDURE — 76819 US OB/GYN PROCEDURE (VIEWPOINT): ICD-10-PCS | Mod: S$GLB,,, | Performed by: OBSTETRICS & GYNECOLOGY

## 2023-07-10 PROCEDURE — 76819 FETAL BIOPHYS PROFIL W/O NST: CPT | Mod: S$GLB,,, | Performed by: OBSTETRICS & GYNECOLOGY

## 2023-07-11 ENCOUNTER — PATIENT MESSAGE (OUTPATIENT)
Dept: RESEARCH | Facility: HOSPITAL | Age: 28
End: 2023-07-11
Payer: COMMERCIAL

## 2023-07-18 ENCOUNTER — ROUTINE PRENATAL (OUTPATIENT)
Dept: OBSTETRICS AND GYNECOLOGY | Facility: CLINIC | Age: 28
End: 2023-07-18
Payer: MEDICAID

## 2023-07-18 VITALS
BODY MASS INDEX: 42.24 KG/M2 | SYSTOLIC BLOOD PRESSURE: 112 MMHG | DIASTOLIC BLOOD PRESSURE: 62 MMHG | WEIGHT: 277.75 LBS

## 2023-07-18 DIAGNOSIS — Z34.00 SUPERVISION OF NORMAL FIRST PREGNANCY, ANTEPARTUM: Primary | ICD-10-CM

## 2023-07-18 PROCEDURE — 59426 ANTEPARTUM CARE ONLY: CPT | Mod: TH,,, | Performed by: OBSTETRICS & GYNECOLOGY

## 2023-07-18 PROCEDURE — 99999 PR PBB SHADOW E&M-EST. PATIENT-LVL I: ICD-10-PCS | Mod: PBBFAC,,,

## 2023-07-18 PROCEDURE — 99999 PR PBB SHADOW E&M-EST. PATIENT-LVL I: CPT | Mod: PBBFAC,,,

## 2023-07-18 PROCEDURE — 59426 PR ANTEPARTUM CARE ONLY, >7 VISITS: ICD-10-PCS | Mod: TH,,, | Performed by: OBSTETRICS & GYNECOLOGY

## 2023-07-18 PROCEDURE — 99211 OFF/OP EST MAY X REQ PHY/QHP: CPT | Mod: PBBFAC,TH

## 2023-07-18 NOTE — PROGRESS NOTES
Pt in to clinic for routine office visit. Pt was previously being seen in [place] but desires to transfer to [facility] for the remainder of her care. Pt reports the practice in [place] has closed. 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 Pt desires a primary  delivery. Had discussed this with the physician in [place] and even though has not had another outbreak she does not want to have a vaginal delivery. Advised pt at office visit that I will get her scheduled for her c/s and let her know the  date and time.

## 2023-07-18 NOTE — H&P
HISTORY AND PHYSICAL                                                OBSTETRICS          Subjective:       Mayda Buck is a 28 y.o.  female with  JOHNATHON 23  Pt with prenatal care at [facility] and tranfsfer to [facility] at 38 weeks.     This IUP is complicated by primary HSV infection- pt on suppressive therapy but desires  delivery., obesity- BMI 42    Review of Systems   Constitutional: Negative.    HENT: Negative.     Eyes: Negative.    Respiratory: Negative.     Cardiovascular: Negative.    Gastrointestinal: Negative.    Genitourinary: Negative.    Musculoskeletal: Negative.    Integumentary:  Negative.   Neurological: Negative.    Psychiatric/Behavioral: Negative.       PMHx: History reviewed. No pertinent past medical history.    PSHx:   Past Surgical History:   Procedure Laterality Date    OPEN REDUCTION AND INTERNAL FIXATION (ORIF) OF FRACTURE OF LATERAL MALLEOLUS Right 2020    Procedure: ORIF, FRACTURE, FIBULA, LATERAL MALLEOLUS;  Surgeon: Nico Rasmussen DO;  Location: [facility] OR;  Service: Orthopedics;  Laterality: Right;  Equipment: [place] 28 Precontoured locking lateral malleous fracture set; 4-1 Cannulated HEADED screw set; [place] 28 Pilon Plating System  Vendor/Rep: [place] 28  C-Arm: Entire  REQUIRES FIRST ASSISTANT STEPHEN    ORIF TIBIA FRACTURE Right 2020    Procedure: ORIF, FRACTURE, TIBIA;  Surgeon: Nico Rasmussen DO;  Location: [facility] OR;  Service: Orthopedics;  Laterality: Right;  Equipment: [place] 28 Precontoured locking lateral malleous fracture set; 4-1 Cannulated HEADED screw set; [place] 28 Pilon Plating System  Vendor/Rep: [place] 28  C-Arm: Entire  REQUIRES FIRST ASSISTANT STEPHEN       All:   Review of patient's allergies indicates:   Allergen Reactions    Animal dander Hives and Other (See Comments)     Other reaction(s): Difficulty breathing    Cat's claw      Other reaction(s): Difficulty breathing, Hives, Sneezing    Cat dander      Other  reaction(s): Other (See Comments)  Sneezing and eyes watering       Meds: (Not in a hospital admission)      SH:   Social History     Socioeconomic History    Marital status: Single   Tobacco Use    Smoking status: Never     Passive exposure: Never    Smokeless tobacco: Never   Substance and Sexual Activity    Alcohol use: Not Currently    Drug use: Not Currently     Types: Marijuana    Sexual activity: Yes     Partners: Male     Birth control/protection: None       FH:   Family History   Problem Relation Age of Onset    Hypertension Maternal Grandmother     Stroke Maternal Grandmother     Stroke Mother        OBHx:   OB History    Para Term  AB Living   1 0 0 0 0 0   SAB IAB Ectopic Multiple Live Births   0 0 0 0 0      # Outcome Date GA Lbr Nicholas/2nd Weight Sex Delivery Anes PTL Lv   1 Current                Objective:       /62   Wt 126 kg (277 lb 12.5 oz)   BMI 42.24 kg/m²     Vitals:    23 1654   BP: 112/62   Weight: 126 kg (277 lb 12.5 oz)       General:   alert, appears stated age and cooperative, no apparent distress   HENT:  normocephalic, atraumatic   Eyes:  extraocular movements and conjunctivae normal   Neck:  supple, range of motion normal, no thyromegaly   Lungs:   no respiratory distress   Heart:   regular rate   Abdomen:  soft, non-tender, non-distended but gravid, no rebound or guarding   Extremities negative edema, negative erythema   FHT: To be performed upon admission                 TOCO: To be performed upon admission   Presentations: To be performed upon admission   Cervix: To be performed upon admission   Sterile Speculum Exam: PRN on admit    EFW by Leopold's: Not performed    Recent Growth Scan: 32 wks- EFW 33%, AC 32%    Lab Review  Blood Type B POS  GBBS: negative  Rubella: Immune  RPR: non reactive  HIV: negative  HepB: negative       Assessment:       IUP with JOHNATHON 23- scheduled primary  delivery sec to HSV infection    Plan:      Risks, benefits,  alternatives and possible complications have been discussed in detail with the patient.   - Consents signed and to chart  - Admit to Labor and Delivery unit  - Epidural per Anesthesia  - Draw CBC, T&  - IOL plan per L&D team  - Maternal pelvis- G1  -     Post-Partum Hemorrhage risk - low

## 2023-07-19 ENCOUNTER — PATIENT MESSAGE (OUTPATIENT)
Dept: RESEARCH | Facility: HOSPITAL | Age: 28
End: 2023-07-19
Payer: COMMERCIAL

## 2023-07-21 ENCOUNTER — OUTSIDE PLACE OF SERVICE (OUTPATIENT)
Dept: OBSTETRICS AND GYNECOLOGY | Facility: CLINIC | Age: 28
End: 2023-07-21
Payer: COMMERCIAL

## 2023-07-21 ENCOUNTER — TELEPHONE (OUTPATIENT)
Dept: OBSTETRICS AND GYNECOLOGY | Facility: OTHER | Age: 28
End: 2023-07-21
Payer: COMMERCIAL

## 2023-07-21 PROCEDURE — 59510 PR FULL ROUT OBSTE CARE,CESAREAN DELIV: ICD-10-PCS | Mod: SC,,, | Performed by: OBSTETRICS & GYNECOLOGY

## 2023-07-21 PROCEDURE — 59510 CESAREAN DELIVERY: CPT | Mod: SC,,, | Performed by: OBSTETRICS & GYNECOLOGY

## 2023-07-24 ENCOUNTER — PATIENT MESSAGE (OUTPATIENT)
Dept: RESEARCH | Facility: HOSPITAL | Age: 28
End: 2023-07-24
Payer: COMMERCIAL

## 2023-07-27 ENCOUNTER — TELEPHONE (OUTPATIENT)
Dept: OBSTETRICS AND GYNECOLOGY | Facility: CLINIC | Age: 28
End: 2023-07-27
Payer: COMMERCIAL

## 2023-07-27 NOTE — TELEPHONE ENCOUNTER
Pt scheduled 7/31/23 at 1145am with Mrs. ERIC Waldrop    ________________________________________________________________  ----- Message from Sabrina Parisi sent at 7/27/2023  9:41 AM CDT -----  Name of Who is Calling: pt        What is the request in detail: Pt is requesting an appt for a c section f/u , Please assist pt with this call.        Can the clinic reply by [facility]: yes        What Number to Call Back if not in [facility]: [phone]

## 2023-07-31 ENCOUNTER — OFFICE VISIT (OUTPATIENT)
Dept: OBSTETRICS AND GYNECOLOGY | Facility: CLINIC | Age: 28
End: 2023-07-31
Payer: MEDICAID

## 2023-07-31 VITALS
HEIGHT: 68 IN | SYSTOLIC BLOOD PRESSURE: 131 MMHG | WEIGHT: 255.81 LBS | HEART RATE: 66 BPM | DIASTOLIC BLOOD PRESSURE: 84 MMHG | BODY MASS INDEX: 38.77 KG/M2

## 2023-07-31 DIAGNOSIS — Z98.890 POST-OPERATIVE STATE: Primary | ICD-10-CM

## 2023-07-31 PROCEDURE — 1159F MED LIST DOCD IN RCRD: CPT | Mod: CPTII,S$GLB,,

## 2023-07-31 PROCEDURE — 0503F POSTPARTUM CARE VISIT: CPT | Mod: S$GLB,,,

## 2023-07-31 PROCEDURE — 3079F DIAST BP 80-89 MM HG: CPT | Mod: CPTII,S$GLB,,

## 2023-07-31 PROCEDURE — 1159F PR MEDICATION LIST DOCUMENTED IN MEDICAL RECORD: ICD-10-PCS | Mod: CPTII,S$GLB,,

## 2023-07-31 PROCEDURE — 3008F PR BODY MASS INDEX (BMI) DOCUMENTED: ICD-10-PCS | Mod: CPTII,S$GLB,,

## 2023-07-31 PROCEDURE — 3075F PR MOST RECENT SYSTOLIC BLOOD PRESS GE 130-139MM HG: ICD-10-PCS | Mod: CPTII,S$GLB,,

## 2023-07-31 PROCEDURE — 3075F SYST BP GE 130 - 139MM HG: CPT | Mod: CPTII,S$GLB,,

## 2023-07-31 PROCEDURE — 3079F PR MOST RECENT DIASTOLIC BLOOD PRESSURE 80-89 MM HG: ICD-10-PCS | Mod: CPTII,S$GLB,,

## 2023-07-31 PROCEDURE — 0503F PR POSTPARTUM CARE VISIT: ICD-10-PCS | Mod: S$GLB,,,

## 2023-07-31 PROCEDURE — 3008F BODY MASS INDEX DOCD: CPT | Mod: CPTII,S$GLB,,

## 2023-07-31 RX ORDER — HYDROCODONE BITARTRATE AND ACETAMINOPHEN 10; 325 MG/1; MG/1
1 TABLET ORAL EVERY 6 HOURS PRN
Qty: 14 TABLET | Refills: 0 | Status: SHIPPED | OUTPATIENT
Start: 2023-07-31 | End: 2023-08-07

## 2023-07-31 RX ORDER — IBUPROFEN 800 MG/1
800 TABLET ORAL EVERY 8 HOURS PRN
Qty: 60 TABLET | Refills: 2 | Status: SHIPPED | OUTPATIENT
Start: 2023-07-31 | End: 2024-07-30

## 2023-08-03 PROBLEM — S82.61XA DISPLACED FRACTURE OF LATERAL MALLEOLUS OF RIGHT FIBULA, INITIAL ENCOUNTER FOR CLOSED FRACTURE: Status: RESOLVED | Noted: 2020-07-20 | Resolved: 2023-08-03

## 2023-08-03 PROBLEM — M79.18 MUSCULOSKELETAL PAIN: Status: RESOLVED | Noted: 2022-12-14 | Resolved: 2023-08-03

## 2023-08-03 PROBLEM — Z3A.32 32 WEEKS GESTATION OF PREGNANCY: Status: RESOLVED | Noted: 2023-06-09 | Resolved: 2023-08-03

## 2023-08-03 PROBLEM — O99.320 MARIJUANA USE DURING PREGNANCY: Status: RESOLVED | Noted: 2023-04-27 | Resolved: 2023-08-03

## 2023-08-03 PROBLEM — F12.90 MARIJUANA USE DURING PREGNANCY: Status: RESOLVED | Noted: 2023-04-27 | Resolved: 2023-08-03

## 2023-08-03 PROBLEM — O99.210 OBESITY AFFECTING PREGNANCY, ANTEPARTUM: Status: RESOLVED | Noted: 2022-12-29 | Resolved: 2023-08-03

## 2023-08-04 NOTE — PROGRESS NOTES
"CC: Post-partum follow-up    Mayda Buck is a 28 y.o. female  who presents for post-partum visit.  She is 2 weeks S/P  section. Primary LTCS for maternal request r/t hx of HSV.  Patient without complaints.  Pain controlled.  Tolerating p.o.  Pain controlled. Healing well. Positive ambulation. Positive flatus.  Bleeding is controlled.  No depression.  No abuse.    Delivery Date: 2023  Delivery MD: Dr. Ibarra  Gender: male  Incision: LTCS  Infant Wt: 7#2oz  Breast Feeding: breast  Depression: No  Contraception: no method  Facility: [facility]    Pregnancy was complicated by:  HSV, BMI > 40, THC use      Current Outpatient Medications:     HYDROcodone-acetaminophen (NORCO)  mg per tablet, Take 1 tablet by mouth every 6 (six) hours as needed for Pain., Disp: 14 tablet, Rfl: 0    ibuprofen (ADVIL,MOTRIN) 800 MG tablet, Take 1 tablet (800 mg total) by mouth every 8 (eight) hours as needed for Pain., Disp: 60 tablet, Rfl: 2    prenatal vit no.130-iron-folic (PRENATAL VITAMIN) 27 mg iron- 800 mcg Tab, Take 1 tablet by mouth once daily., Disp: 90 tablet, Rfl: 0    valACYclovir (VALTREX) 500 MG tablet, Take 500 mg by mouth., Disp: , Rfl:      ROS:  GENERAL: No fever, chills, fatigability.  VULVAR: No pain, no lesions and no itching.  VAGINAL: No relaxation, no itching, no discharge, no abnormal bleeding and no lesions.  ABDOMEN: No abdominal pain. Denies nausea. Denies vomiting. No diarrhea. No constipation  BREAST: Denies pain. No lumps.  URINARY: No incontinence, no nocturia, no frequency and no dysuria.  CARDIOVASCULAR: No chest pain. No shortness of breath. No leg cramps.  NEUROLOGICAL: No headaches. No vision changes.      PHYSICAL EXAM:  /84   Pulse 66   Ht 5' 8" (1.727 m)   Wt 116 kg (255 lb 12.8 oz)   Breastfeeding Unknown   BMI 38.89 kg/m²    Exam chaperoned by nurse  General:  Well-developed, well-nourished female in no acute distress  HEENT:  Normocephalic, " atraumatic, extraocular muscles intact  Breasts:  Nontender, no masses, bilaterally symmetric  Abdomen:  Soft, nontender, nondistended, fundus firm and below umbilicus, incision c/d/i  Extremities:  Warm, dry, no clubbing/cyanosis/edema  Neurologic:  Cranial nerves 2-12 grossly intact  Dermatologic:  No rashes/lesions/bruising    IMP:  Status post  section    PLAN:  Doing well.  Depression precautions discussed.  Method of contraception discussed and patient has decided.  Pregnancy spacing discussed.  Patient expressed understanding.  Return fin 4 weeks for postpartum visit.

## 2023-08-28 ENCOUNTER — POSTPARTUM VISIT (OUTPATIENT)
Dept: OBSTETRICS AND GYNECOLOGY | Facility: CLINIC | Age: 28
End: 2023-08-28
Payer: MEDICAID

## 2023-08-28 VITALS
SYSTOLIC BLOOD PRESSURE: 136 MMHG | DIASTOLIC BLOOD PRESSURE: 66 MMHG | WEIGHT: 253.81 LBS | BODY MASS INDEX: 38.47 KG/M2 | HEIGHT: 68 IN

## 2023-08-28 DIAGNOSIS — Z30.09 ENCOUNTER FOR COUNSELING REGARDING CONTRACEPTION: ICD-10-CM

## 2023-08-28 DIAGNOSIS — M54.30 SCIATICA, UNSPECIFIED LATERALITY: ICD-10-CM

## 2023-08-28 DIAGNOSIS — Z86.19 HX OF HERPES GENITALIS: ICD-10-CM

## 2023-08-28 DIAGNOSIS — N80.9 ENDOMETRIOSIS: ICD-10-CM

## 2023-08-28 PROBLEM — Z72.0 TOBACCO USE: Status: RESOLVED | Noted: 2022-11-30 | Resolved: 2023-08-28

## 2023-08-28 PROCEDURE — 0503F POSTPARTUM CARE VISIT: CPT | Mod: CPTII,S$GLB,,

## 2023-08-28 PROCEDURE — 0503F PR POSTPARTUM CARE VISIT: ICD-10-PCS | Mod: CPTII,S$GLB,,

## 2023-08-28 RX ORDER — LEVONORGESTREL / ETHINYL ESTRADIOL AND ETHINYL ESTRADIOL 150-30(84)
1 KIT ORAL DAILY
Qty: 84 EACH | Refills: 3 | Status: SHIPPED | OUTPATIENT
Start: 2023-08-28 | End: 2024-08-27

## 2023-08-28 RX ORDER — PREDNISONE 10 MG/1
10 TABLET ORAL DAILY
Qty: 10 TABLET | Refills: 0 | Status: SHIPPED | OUTPATIENT
Start: 2023-08-28 | End: 2023-08-28

## 2023-08-28 RX ORDER — PREDNISONE 10 MG/1
10 TABLET ORAL DAILY
Qty: 10 TABLET | Refills: 0 | Status: SHIPPED | OUTPATIENT
Start: 2023-08-28 | End: 2023-09-07

## 2023-08-28 RX ORDER — VALACYCLOVIR HYDROCHLORIDE 500 MG/1
500 TABLET, FILM COATED ORAL 2 TIMES DAILY
Qty: 60 TABLET | Refills: 11 | Status: SHIPPED | OUTPATIENT
Start: 2023-08-28 | End: 2024-08-27

## 2023-09-04 PROBLEM — N80.9 ENDOMETRIOSIS: Status: ACTIVE | Noted: 2023-09-04

## 2023-09-05 NOTE — PROGRESS NOTES
CC: Post-partum follow-up    Mayda Buck is a 28 y.o. female  who presents for post-partum visit.  She is 4 weeks S/P  section.  Patient without complaints.  Pain controlled.  Tolerating p.o.  Pain controlled. Healing well. Positive ambulation. Positive flatus.  Bleeding is controlled.  No depression.  No abuse.    Patient reporting pain on right side of her lower back. States it has been going on x 2 weeks. It is aggravated by static position. Alleviated by positional changes. Has not responded to pain medication or heating pads. Discussed sciatic nerve inflammation. Will try oral corticosteroids at this time and referral to ortho in case of no improvement. Patient agreeable to plan of care.     Delivery Date: 2023  Delivery MD: Dr. Ibarra  Gender: male  Breast Feeding: bottle  Depression: No  Contraception: oral contraceptives (estrogen/progesterone)  Facility: [facility]    Pregnancy was complicated by:  HSV, BMI 40 +      Current Outpatient Medications:     ibuprofen (ADVIL,MOTRIN) 800 MG tablet, Take 1 tablet (800 mg total) by mouth every 8 (eight) hours as needed for Pain., Disp: 60 tablet, Rfl: 2    prenatal vit no.130-iron-folic (PRENATAL VITAMIN) 27 mg iron- 800 mcg Tab, Take 1 tablet by mouth once daily., Disp: 90 tablet, Rfl: 0    valACYclovir (VALTREX) 500 MG tablet, Take 1 tablet (500 mg total) by mouth 2 (two) times daily., Disp: 60 tablet, Rfl: 11    L norgest/e.estradioL-e.estrad (SEASONIQUE) 0.15 mg-30 mcg (84)/10 mcg (7) 3MPk, Take 1 tablet by mouth once daily., Disp: 84 each, Rfl: 3    predniSONE (DELTASONE) 10 MG tablet, Take 1 tablet (10 mg total) by mouth once daily. for 10 days, Disp: 10 tablet, Rfl: 0     ROS:  GENERAL: No fever, chills, fatigability.  VULVAR: No pain, no lesions and no itching.  VAGINAL: No relaxation, no itching, no discharge, no abnormal bleeding and no lesions.  ABDOMEN: No abdominal pain. Denies nausea. Denies vomiting. No  "diarrhea. No constipation  BREAST: Denies pain. No lumps.  URINARY: No incontinence, no nocturia, no frequency and no dysuria.  CARDIOVASCULAR: No chest pain. No shortness of breath. No leg cramps.  NEUROLOGICAL: No headaches. No vision changes.      PHYSICAL EXAM:  /66   Ht 5' 8" (1.727 m)   Wt 115.1 kg (253 lb 12.8 oz)   LMP  (Exact Date)   Breastfeeding Yes   BMI 38.59 kg/m²    Exam chaperoned by nurse  General:  Well-developed, well-nourished female in no acute distress  HEENT:  Normocephalic, atraumatic, extraocular muscles intact  Breasts:  Nontender, no masses, bilaterally symmetric  Abdomen:  Soft, nontender, nondistended, fundus firm and below umbilicus, incision c/d/i  Extremities:  Warm, dry, no clubbing/cyanosis/edema  Neurologic:  Cranial nerves 2-12 grossly intact  Dermatologic:  No rashes/lesions/bruising    IMP:  Status post  section    PLAN:  Doing well.  Depression precautions discussed.  Method of contraception discussed and patient has decided.  Pregnancy spacing discussed.  Patient expressed understanding.  Return for annual exam.     "

## 2023-09-12 ENCOUNTER — OFFICE VISIT (OUTPATIENT)
Dept: OBSTETRICS AND GYNECOLOGY | Facility: CLINIC | Age: 28
End: 2023-09-12
Payer: COMMERCIAL

## 2023-09-12 DIAGNOSIS — M54.30 SCIATICA, UNSPECIFIED LATERALITY: ICD-10-CM

## 2023-09-12 DIAGNOSIS — F41.9 ANXIETY: Primary | ICD-10-CM

## 2023-09-12 DIAGNOSIS — F41.0 PANIC ATTACKS: ICD-10-CM

## 2023-09-12 PROCEDURE — 1159F MED LIST DOCD IN RCRD: CPT | Mod: S$GLB,,,

## 2023-09-12 PROCEDURE — 99215 OFFICE O/P EST HI 40 MIN: CPT | Mod: S$GLB,,,

## 2023-09-12 PROCEDURE — 1159F PR MEDICATION LIST DOCUMENTED IN MEDICAL RECORD: ICD-10-PCS | Mod: S$GLB,,,

## 2023-09-12 PROCEDURE — 99215 PR OFFICE/OUTPT VISIT, EST, LEVL V, 40-54 MIN: ICD-10-PCS | Mod: S$GLB,,,

## 2023-09-12 RX ORDER — BUSPIRONE HYDROCHLORIDE 15 MG/1
15 TABLET ORAL 2 TIMES DAILY
Qty: 60 TABLET | Refills: 11 | Status: SHIPPED | OUTPATIENT
Start: 2023-09-12 | End: 2024-09-11

## 2023-09-12 NOTE — PROGRESS NOTES
HISTORY OF PRESENT ILLNESS:    Mayda Buck is a 28 y.o. female, , No LMP recorded (exact date).,  presents for a problem visit, complaining of anxiety. Patient seen at [facility] 3 weeks ago for generalized anxiety disorder. Patient screened at postpartum visit- denied depression and anxiety at that time. Patient did report recent death of her grandmother but stated it had not hit her yet. Patient states she now feels that death of her grandmother has begun to hit her. She is feeling anxious and emotional. Denies thoughts of self harm and harm to others. Medical hx + for BEAN and Panic Attacks. Denies any panic attacks since prior to her pregnancy. Patient states she has good social support from friends.     Patient was prescribed buspar from [facility] for anxiety. Patient states she feels calm on Buspar but 10 mg twice a day does not seem to be enough for anxiety symptoms. We discussed increasing dose to 15 mg BID. Patient desires. I offered patient bereavement counseling, she declines at this time. Patient appears level headed and has good support with her friends, she states she has people in her [place] whom she can talk to.         No past medical history on file.    Past Surgical History:   Procedure Laterality Date    OPEN REDUCTION AND INTERNAL FIXATION (ORIF) OF FRACTURE OF LATERAL MALLEOLUS Right 2020    Procedure: ORIF, FRACTURE, FIBULA, LATERAL MALLEOLUS;  Surgeon: Nico Rasmussen DO;  Location: [facility] OR;  Service: Orthopedics;  Laterality: Right;  Equipment: [place] 28 Precontoured locking lateral malleous fracture set; 4-1 Cannulated HEADED screw set; [place] 28 Pilon Plating System  Vendor/Rep: [place] 28  C-Arm: Entire  REQUIRES FIRST ASSISTANT STEPHEN    ORIF TIBIA FRACTURE Right 2020    Procedure: ORIF, FRACTURE, TIBIA;  Surgeon: Nico Rasmussen DO;  Location: [facility] OR;  Service: Orthopedics;  Laterality: Right;  Equipment: [place] 28 Precontoured locking lateral malleous fracture set; 4-1  Cannulated HEADED screw set; [place] 28 Pilon Plating System  Vendor/Rep: [place] 28  C-Arm: Entire  REQUIRES FIRST ASSISTANT STEPHEN       MEDICATIONS AND ALLERGIES:      Current Outpatient Medications:     busPIRone (BUSPAR) 15 MG tablet, Take 1 tablet (15 mg total) by mouth 2 (two) times daily., Disp: 60 tablet, Rfl: 11    ibuprofen (ADVIL,MOTRIN) 800 MG tablet, Take 1 tablet (800 mg total) by mouth every 8 (eight) hours as needed for Pain., Disp: 60 tablet, Rfl: 2    L norgest/e.estradioL-e.estrad (SEASONIQUE) 0.15 mg-30 mcg (84)/10 mcg (7) 3MPk, Take 1 tablet by mouth once daily., Disp: 84 each, Rfl: 3    prenatal vit no.130-iron-folic (PRENATAL VITAMIN) 27 mg iron- 800 mcg Tab, Take 1 tablet by mouth once daily., Disp: 90 tablet, Rfl: 0    valACYclovir (VALTREX) 500 MG tablet, Take 1 tablet (500 mg total) by mouth 2 (two) times daily., Disp: 60 tablet, Rfl: 11    Review of patient's allergies indicates:   Allergen Reactions    Animal dander Hives and Other (See Comments)     Other reaction(s): Difficulty breathing    Cat's claw      Other reaction(s): Difficulty breathing, Hives, Sneezing    Cat dander      Other reaction(s): Other (See Comments)  Sneezing and eyes watering       Family History   Problem Relation Age of Onset    Hypertension Maternal Grandmother     Stroke Maternal Grandmother     Stroke Mother        Social History     Socioeconomic History    Marital status: Single   Tobacco Use    Smoking status: Never     Passive exposure: Never    Smokeless tobacco: Never   Substance and Sexual Activity    Alcohol use: Not Currently    Drug use: Not Currently     Types: Marijuana    Sexual activity: Yes     Partners: Male     Birth control/protection: None       ROS:  GENERAL: No weight changes. No swelling. No fatigue. No fever.  CARDIOVASCULAR: No chest pain. No shortness of breath. No leg cramps.   NEUROLOGICAL: No headaches. No vision changes.  BREASTS: No pain. No lumps. No discharge.  ABDOMEN: No  pain. No nausea. No vomiting. No diarrhea. No constipation.  REPRODUCTIVE: No abnormal bleeding.   VULVA: No pain. No lesions. No itching.  VAGINA: No relaxation. No itching. No odor. No discharge. No lesions.  URINARY: No incontinence. No nocturia. No frequency. No dysuria.    LMP  (Exact Date)     PE:  APPEARANCE: Well nourished, well developed, in no acute distress.  ABDOMEN: Soft. No tenderness or masses. No hepatosplenomegaly. No hernias.  BREASTS, FUNDOSCOPIC, RECTAL DEFERRED  PELVIC: External female genitalia without lesions.  Female hair distribution. Adequate perineal body, Normal urethral meatus. Vagina moist and well rugated without lesions or discharge.  No significant cystocele or rectocele present. Cervix pink without lesions, discharge or tenderness. Uterus is normal size, regular, mobile and nontender. Adnexa without masses or tenderness.  EXTREMITIES: No edema      DIAGNOSIS & PLAN  1. Anxiety  busPIRone (BUSPAR) 15 MG tablet      2. Sciatica, unspecified laterality  Ambulatory referral/consult to Orthopedics              COUNSELING:           I spent 40 addressing problems separate from annual exam.  This includes face to face time and non-face to face time preparing to see the patient (eg, review of tests), Obtaining and/or reviewing separately obtained history, Documenting clinical information in the electronic or other health record, Independently interpreting resultsand communicating results to the patient/family/caregiver, or Care coordination.

## 2023-09-12 NOTE — LETTER
September 12, 2023      [facility]  [address] [facility] [place] [address]  Phone: [phone]  Fax: [phone]       Patient: Mayda Buck   YOB: 1995  Date of Visit: 09/12/2023    To Whom It May Concern:    Abdoulaye Buck  was at [facility] on 09/12/2023. She is currently experiencing right lower acute sciatic pain. She is being referred to orthopedic physician and is pending evaluation. She may return to work on or after 10/16/2023 with no restrictions as she she be seen by orthopedics by this date. If you have any questions or concerns, or if I can be of further assistance, please do not hesitate to contact me.    Sincerely,     Keely Waldrop CNM

## 2023-09-15 ENCOUNTER — TELEPHONE (OUTPATIENT)
Dept: OBSTETRICS AND GYNECOLOGY | Facility: CLINIC | Age: 28
End: 2023-09-15
Payer: COMMERCIAL

## 2023-09-15 NOTE — TELEPHONE ENCOUNTER
Pt states [facility] paper work was handled by a  Judy . No more assistance would be needed.    ++++++++++++++++++++++++++++++++++++++++++++++++++++++++++++++++++++++++++++    ----- Message from Trevor Figueroa sent at 9/11/2023 12:00 PM CDT -----  Regarding: Return Call  Contact: patient  Type:  Patient Returning Call    Who Called:patient  Who Left Message for Patient:Isabella Raines  Does the patient know what this is regarding?:please call   Would the patient rather a call back or a response via [facility]?   Best Call Back Number:[phone]  Additional Information:

## 2024-04-16 ENCOUNTER — PATIENT MESSAGE (OUTPATIENT)
Dept: OBSTETRICS AND GYNECOLOGY | Facility: CLINIC | Age: 29
End: 2024-04-16
Payer: COMMERCIAL

## 2024-04-17 ENCOUNTER — OFFICE VISIT (OUTPATIENT)
Dept: OBSTETRICS AND GYNECOLOGY | Facility: CLINIC | Age: 29
End: 2024-04-17
Payer: MEDICAID

## 2024-04-17 ENCOUNTER — LAB VISIT (OUTPATIENT)
Dept: LAB | Facility: CLINIC | Age: 29
End: 2024-04-17
Payer: COMMERCIAL

## 2024-04-17 VITALS
WEIGHT: 293 LBS | BODY MASS INDEX: 44.41 KG/M2 | SYSTOLIC BLOOD PRESSURE: 126 MMHG | DIASTOLIC BLOOD PRESSURE: 76 MMHG | HEIGHT: 68 IN

## 2024-04-17 DIAGNOSIS — Z01.419 WOMEN'S ANNUAL ROUTINE GYNECOLOGICAL EXAMINATION: Primary | ICD-10-CM

## 2024-04-17 DIAGNOSIS — Z13.1 DIABETES MELLITUS SCREENING: ICD-10-CM

## 2024-04-17 DIAGNOSIS — Z01.419 WOMEN'S ANNUAL ROUTINE GYNECOLOGICAL EXAMINATION: ICD-10-CM

## 2024-04-17 DIAGNOSIS — Z13.29 THYROID DISORDER SCREEN: ICD-10-CM

## 2024-04-17 DIAGNOSIS — Z11.3 ROUTINE SCREENING FOR STI (SEXUALLY TRANSMITTED INFECTION): ICD-10-CM

## 2024-04-17 PROCEDURE — 87624 HPV HI-RISK TYP POOLED RSLT: CPT | Performed by: OBSTETRICS & GYNECOLOGY

## 2024-04-17 PROCEDURE — 84443 ASSAY THYROID STIM HORMONE: CPT | Performed by: OBSTETRICS & GYNECOLOGY

## 2024-04-17 PROCEDURE — 3074F SYST BP LT 130 MM HG: CPT | Mod: CPTII,S$GLB,, | Performed by: OBSTETRICS & GYNECOLOGY

## 2024-04-17 PROCEDURE — 3078F DIAST BP <80 MM HG: CPT | Mod: CPTII,S$GLB,, | Performed by: OBSTETRICS & GYNECOLOGY

## 2024-04-17 PROCEDURE — 83036 HEMOGLOBIN GLYCOSYLATED A1C: CPT | Performed by: OBSTETRICS & GYNECOLOGY

## 2024-04-17 PROCEDURE — 3008F BODY MASS INDEX DOCD: CPT | Mod: CPTII,S$GLB,, | Performed by: OBSTETRICS & GYNECOLOGY

## 2024-04-17 PROCEDURE — 1159F MED LIST DOCD IN RCRD: CPT | Mod: CPTII,S$GLB,, | Performed by: OBSTETRICS & GYNECOLOGY

## 2024-04-17 PROCEDURE — 99395 PREV VISIT EST AGE 18-39: CPT | Mod: S$GLB,,, | Performed by: OBSTETRICS & GYNECOLOGY

## 2024-04-17 PROCEDURE — 87491 CHLMYD TRACH DNA AMP PROBE: CPT | Performed by: OBSTETRICS & GYNECOLOGY

## 2024-04-17 NOTE — PROGRESS NOTES
Annual Well Woman's Exam  History & Physical      SUBJECTIVE:     History of Present Illness:  Patient is a 29 y.o. female presents for her annual exam. He is 9 months postpartum after  for her son secondary to a hip injury.  She reports difficulty losing weight and has continued to gain weight since giving birth.    Chief Complaint   Patient presents with    Well Woman       Review of patient's allergies indicates:   Allergen Reactions    Animal dander Hives and Other (See Comments)     Other reaction(s): Difficulty breathing    Cat's claw      Other reaction(s): Difficulty breathing, Hives, Sneezing    Cat dander      Other reaction(s): Other (See Comments)  Sneezing and eyes watering       Current Outpatient Medications   Medication Sig Dispense Refill    busPIRone (BUSPAR) 15 MG tablet Take 1 tablet (15 mg total) by mouth 2 (two) times daily. (Patient not taking: Reported on 2024) 60 tablet 11    ibuprofen (ADVIL,MOTRIN) 800 MG tablet Take 1 tablet (800 mg total) by mouth every 8 (eight) hours as needed for Pain. (Patient not taking: Reported on 2024) 60 tablet 2    L norgest/e.estradioL-e.estrad (SEASONIQUE) 0.15 mg-30 mcg (84)/10 mcg (7) 3MPk Take 1 tablet by mouth once daily. (Patient not taking: Reported on 2024) 84 each 3    valACYclovir (VALTREX) 500 MG tablet Take 1 tablet (500 mg total) by mouth 2 (two) times daily. (Patient not taking: Reported on 2024) 60 tablet 11     No current facility-administered medications for this visit.     OB History          1    Para   1    Term   1            AB        Living   1         SAB        IAB        Ectopic        Multiple        Live Births   1             Patient's last menstrual period was 2024 (approximate).      No past medical history on file.  Past Surgical History:   Procedure Laterality Date    OPEN REDUCTION AND INTERNAL FIXATION (ORIF) OF FRACTURE OF LATERAL MALLEOLUS Right 2020    Procedure: ORIF,  "FRACTURE, FIBULA, LATERAL MALLEOLUS;  Surgeon: Nico Rasmussen DO;  Location: [facility] OR;  Service: Orthopedics;  Laterality: Right;  Equipment: [employer] Precontoured locking lateral malleous fracture set; 4-1 Cannulated HEADED screw set; [employer] Pilon Plating System  Vendor/Rep: [id number]  C-Arm: Entire  REQUIRES FIRST ASSISTANT STEPHEN    ORIF TIBIA FRACTURE Right 7/20/2020    Procedure: ORIF, FRACTURE, TIBIA;  Surgeon: Nico Rasmussen DO;  Location: [facility] OR;  Service: Orthopedics;  Laterality: Right;  Equipment: [employer] Precontoured locking lateral malleous fracture set; 4-1 Cannulated HEADED screw set; [employer] Pilon Plating System  Vendor/Rep: [id number]  C-Arm: Entire  REQUIRES FIRST ASSISTANT STEPHEN     Family History   Problem Relation Name Age of Onset    Hypertension Maternal Grandmother      Stroke Maternal Grandmother      Stroke Mother       Social History     Tobacco Use    Smoking status: Never     Passive exposure: Never    Smokeless tobacco: Never   Substance Use Topics    Alcohol use: Not Currently    Drug use: Not Currently     Types: Marijuana        OBJECTIVE:     Vital Signs (Most Recent)  BP: 126/76 (04/17/24 1258)  5' 8" (1.727 m)  (!) 138.3 kg (305 lb)     Physical Exam:  Physical Exam  Vitals reviewed.   Constitutional:       Appearance: Normal appearance.   HENT:      Head: Normocephalic.   Neck:      Thyroid: No thyroid mass, thyromegaly or thyroid tenderness.   Pulmonary:      Effort: Pulmonary effort is normal.   Chest:   Breasts:     Right: Normal.      Left: Normal.   Abdominal:      General: Abdomen is flat.      Palpations: Abdomen is soft.   Genitourinary:     General: Normal vulva.      Vagina: Normal.      Cervix: Normal.      Uterus: Normal.       Adnexa: Right adnexa normal and left adnexa normal.   Lymphadenopathy:      Upper Body:      Right upper body: No axillary adenopathy.      Left upper body: No axillary adenopathy.   Skin:     General: Skin is warm and dry. "   Neurological:      General: No focal deficit present.      Mental Status: She is alert and oriented to person, place, and time.   Psychiatric:         Mood and Affect: Mood normal.         Behavior: Behavior normal.       UPT neg    ASSESSMENT/PLAN:       ICD-10-CM ICD-9-CM   1. Women's annual routine gynecological examination  Z01.419 V72.31   2. Diabetes mellitus screening  Z13.1 V77.1   3. Thyroid disorder screen  Z13.29 V77.0   4. Routine screening for STI (sexually transmitted infection)  Z11.3 V74.5       PLAN:    Pap with HPV co-testing today  GC/chlamydia screening and vaginosis panel ordered per patient request  TSH and hemoglobin A1c ordered  Contraception:  Declines  Follow up in 1 year for annual exam or sooner as needed    Luciana Villeda MD   Gynecology    [address]  [address]  [place] [address] [phone]

## 2024-04-18 LAB
ESTIMATED AVG GLUCOSE: 114 MG/DL (ref 68–131)
HBA1C MFR BLD: 5.6 % (ref 4–5.6)
TSH SERPL DL<=0.005 MIU/L-ACNC: 0.98 UIU/ML (ref 0.4–4)

## 2024-04-18 PROCEDURE — 36415 COLL VENOUS BLD VENIPUNCTURE: CPT | Mod: ,,, | Performed by: OBSTETRICS & GYNECOLOGY

## 2024-04-22 LAB
C TRACH DNA SPEC QL NAA+PROBE: NOT DETECTED
N GONORRHOEA DNA SPEC QL NAA+PROBE: NOT DETECTED

## 2024-04-24 LAB
FINAL PATHOLOGIC DIAGNOSIS: NORMAL
Lab: NORMAL

## 2024-04-26 LAB
HPV HR 12 DNA SPEC QL NAA+PROBE: NEGATIVE
HPV16 AG SPEC QL: NEGATIVE
HPV18 DNA SPEC QL NAA+PROBE: NEGATIVE

## (undated) DEVICE — NDL ELECTRODE E-Z CLEAN 2.75IN

## (undated) DEVICE — BLADE #15 STERILE CARBON

## (undated) DEVICE — DRAPE C ARM 42 X 120 10/BX

## (undated) DEVICE — SUT 3-0 VICRYL / SH (J416)

## (undated) DEVICE — GLOVE PI ULTRA TOUCH G SURGEON

## (undated) DEVICE — UNDERGLOVE BIOGEL PI SZ 6.5 LF

## (undated) DEVICE — GAUZE SPONGE 4X4 12PLY

## (undated) DEVICE — GLOVE SURG ULTRA TOUCH 7

## (undated) DEVICE — SEE MEDLINE ITEM 146231

## (undated) DEVICE — GLOVE SURG ULTRA TOUCH 9

## (undated) DEVICE — DRAPE PLASTIC U 60X72

## (undated) DEVICE — GLOVE SURG ULTRA TOUCH 8.5

## (undated) DEVICE — SEE MEDLINE ITEM 157166

## (undated) DEVICE — SUT MONO 3-0 PS-2 18 PLST

## (undated) DEVICE — DRAPE C-ARMOR EQUIPMENT COVER

## (undated) DEVICE — GLOVE SURGEONS ULTRA TOUCH 6.5

## (undated) DEVICE — DRAPE STERI U-SHAPED 47X51IN

## (undated) DEVICE — DRAPE STERI INSTRUMENT 1018

## (undated) DEVICE — SHEET DRAPE FAN-FOLDED 3/4

## (undated) DEVICE — GOWN B1 X-LG X-LONG

## (undated) DEVICE — SEE MEDLINE ITEM 152530

## (undated) DEVICE — UNDERGLOVES BIOGEL PI SIZE 8

## (undated) DEVICE — CLOSURE SKIN STERI STRIP 1/2X4

## (undated) DEVICE — ADHESIVE MASTISOL VIAL 48/BX

## (undated) DEVICE — TOURNIQUET SB QC SP 34X4IN

## (undated) DEVICE — SEE MEDLINE ITEM 157116

## (undated) DEVICE — CHLORAPREP W TINT 26ML APPL

## (undated) DEVICE — DRESSING N ADH OIL EMUL 3X3

## (undated) DEVICE — SUT 2-0 VICRYL / SH (J417)

## (undated) DEVICE — COVER LIGHT HANDLE 80/CA

## (undated) DEVICE — SEE MEDLINE ITEM 156964